# Patient Record
Sex: FEMALE | Race: WHITE | NOT HISPANIC OR LATINO | Employment: UNEMPLOYED
[De-identification: names, ages, dates, MRNs, and addresses within clinical notes are randomized per-mention and may not be internally consistent; named-entity substitution may affect disease eponyms.]

---

## 2017-02-27 ENCOUNTER — RECORDS - HEALTHEAST (OUTPATIENT)
Dept: ADMINISTRATIVE | Facility: OTHER | Age: 52
End: 2017-02-27

## 2017-05-15 ENCOUNTER — AMBULATORY - HEALTHEAST (OUTPATIENT)
Dept: SURGERY | Facility: CLINIC | Age: 52
End: 2017-05-15

## 2017-05-15 ENCOUNTER — COMMUNICATION - HEALTHEAST (OUTPATIENT)
Dept: SURGERY | Facility: CLINIC | Age: 52
End: 2017-05-15

## 2017-05-15 DIAGNOSIS — Z98.84 HISTORY OF ROUX-EN-Y GASTRIC BYPASS: ICD-10-CM

## 2017-05-15 DIAGNOSIS — D50.9 IRON DEFICIENCY ANEMIA: ICD-10-CM

## 2017-05-19 ENCOUNTER — RECORDS - HEALTHEAST (OUTPATIENT)
Dept: ADMINISTRATIVE | Facility: OTHER | Age: 52
End: 2017-05-19

## 2017-06-05 ENCOUNTER — AMBULATORY - HEALTHEAST (OUTPATIENT)
Dept: SURGERY | Facility: CLINIC | Age: 52
End: 2017-06-05

## 2017-06-05 DIAGNOSIS — Z98.84 S/P BARIATRIC SURGERY: ICD-10-CM

## 2017-06-05 DIAGNOSIS — K91.2 OTHER AND UNSPECIFIED POSTSURGICAL NONABSORPTION: ICD-10-CM

## 2017-06-05 DIAGNOSIS — K90.9 UNSPECIFIED INTESTINAL MALABSORPTION: ICD-10-CM

## 2017-06-26 ENCOUNTER — OFFICE VISIT - HEALTHEAST (OUTPATIENT)
Dept: SURGERY | Facility: CLINIC | Age: 52
End: 2017-06-26

## 2017-06-26 ENCOUNTER — AMBULATORY - HEALTHEAST (OUTPATIENT)
Dept: LAB | Facility: CLINIC | Age: 52
End: 2017-06-26

## 2017-06-26 DIAGNOSIS — K91.2 POSTOPERATIVE MALABSORPTION: ICD-10-CM

## 2017-06-26 DIAGNOSIS — R79.89 LOW VITAMIN D LEVEL: ICD-10-CM

## 2017-06-26 DIAGNOSIS — Z71.6 ENCOUNTER FOR SMOKING CESSATION COUNSELING: ICD-10-CM

## 2017-06-26 DIAGNOSIS — F17.200 TOBACCO DEPENDENCE: ICD-10-CM

## 2017-06-26 DIAGNOSIS — D50.9 IRON DEFICIENCY ANEMIA: ICD-10-CM

## 2017-06-26 DIAGNOSIS — K91.2 OTHER AND UNSPECIFIED POSTSURGICAL NONABSORPTION: ICD-10-CM

## 2017-06-26 DIAGNOSIS — Z98.84 S/P BARIATRIC SURGERY: ICD-10-CM

## 2017-06-26 DIAGNOSIS — Z98.84 HISTORY OF ROUX-EN-Y GASTRIC BYPASS: ICD-10-CM

## 2017-06-26 DIAGNOSIS — K90.9 UNSPECIFIED INTESTINAL MALABSORPTION: ICD-10-CM

## 2017-06-26 LAB
CHOLEST SERPL-MCNC: 214 MG/DL
FASTING STATUS PATIENT QL REPORTED: YES
HDLC SERPL-MCNC: 45 MG/DL
LDLC SERPL CALC-MCNC: 137 MG/DL
TRIGL SERPL-MCNC: 159 MG/DL

## 2017-06-26 ASSESSMENT — MIFFLIN-ST. JEOR: SCORE: 1154.73

## 2017-06-28 ENCOUNTER — HOSPITAL ENCOUNTER (OUTPATIENT)
Dept: ADMINISTRATIVE | Facility: OTHER | Age: 52
Discharge: HOME | End: 2017-06-28

## 2017-06-30 ENCOUNTER — COMMUNICATION - HEALTHEAST (OUTPATIENT)
Dept: SURGERY | Facility: CLINIC | Age: 52
End: 2017-06-30

## 2017-09-22 ENCOUNTER — OFFICE VISIT - HEALTHEAST (OUTPATIENT)
Dept: SURGERY | Facility: CLINIC | Age: 52
End: 2017-09-22

## 2017-09-22 DIAGNOSIS — Z98.84 HISTORY OF ROUX-EN-Y GASTRIC BYPASS: ICD-10-CM

## 2017-09-22 DIAGNOSIS — F17.200 TOBACCO DEPENDENCE: ICD-10-CM

## 2017-09-22 DIAGNOSIS — K28.9 ANASTOMOTIC ULCER: ICD-10-CM

## 2017-09-22 ASSESSMENT — MIFFLIN-ST. JEOR: SCORE: 1159.27

## 2018-03-23 ENCOUNTER — RECORDS - HEALTHEAST (OUTPATIENT)
Dept: ADMINISTRATIVE | Facility: OTHER | Age: 53
End: 2018-03-23

## 2018-05-18 ENCOUNTER — RECORDS - HEALTHEAST (OUTPATIENT)
Dept: ADMINISTRATIVE | Facility: OTHER | Age: 53
End: 2018-05-18

## 2018-06-18 ENCOUNTER — AMBULATORY - HEALTHEAST (OUTPATIENT)
Dept: SURGERY | Facility: CLINIC | Age: 53
End: 2018-06-18

## 2018-06-18 DIAGNOSIS — Z98.84 S/P BARIATRIC SURGERY: ICD-10-CM

## 2018-06-18 DIAGNOSIS — K91.2 POSTSURGICAL MALABSORPTION: ICD-10-CM

## 2018-06-18 DIAGNOSIS — K90.9 INTESTINAL MALABSORPTION, UNSPECIFIED TYPE: ICD-10-CM

## 2018-06-26 ENCOUNTER — OFFICE VISIT - HEALTHEAST (OUTPATIENT)
Dept: SURGERY | Facility: CLINIC | Age: 53
End: 2018-06-26

## 2018-06-26 ENCOUNTER — AMBULATORY - HEALTHEAST (OUTPATIENT)
Dept: LAB | Facility: CLINIC | Age: 53
End: 2018-06-26

## 2018-06-26 DIAGNOSIS — K91.2 POSTSURGICAL MALABSORPTION: ICD-10-CM

## 2018-06-26 DIAGNOSIS — Z98.84 HISTORY OF ROUX-EN-Y GASTRIC BYPASS: ICD-10-CM

## 2018-06-26 DIAGNOSIS — K90.9 INTESTINAL MALABSORPTION, UNSPECIFIED TYPE: ICD-10-CM

## 2018-06-26 DIAGNOSIS — F17.200 TOBACCO DEPENDENCE: ICD-10-CM

## 2018-06-26 DIAGNOSIS — Z98.84 S/P BARIATRIC SURGERY: ICD-10-CM

## 2018-06-26 LAB
25(OH)D3 SERPL-MCNC: 40.5 NG/ML (ref 30–80)
ALBUMIN SERPL-MCNC: 3.5 G/DL (ref 3.5–5)
ALP SERPL-CCNC: 98 U/L (ref 45–120)
ALT SERPL W P-5'-P-CCNC: 23 U/L (ref 0–45)
ANION GAP SERPL CALCULATED.3IONS-SCNC: 9 MMOL/L (ref 5–18)
AST SERPL W P-5'-P-CCNC: 29 U/L (ref 0–40)
BILIRUB SERPL-MCNC: 0.5 MG/DL (ref 0–1)
BUN SERPL-MCNC: 11 MG/DL (ref 8–22)
CALCIUM SERPL-MCNC: 8.8 MG/DL (ref 8.5–10.5)
CHLORIDE BLD-SCNC: 109 MMOL/L (ref 98–107)
CHOLEST SERPL-MCNC: 172 MG/DL
CO2 SERPL-SCNC: 25 MMOL/L (ref 22–31)
CREAT SERPL-MCNC: 0.69 MG/DL (ref 0.6–1.1)
ERYTHROCYTE [DISTWIDTH] IN BLOOD BY AUTOMATED COUNT: 17.2 % (ref 11–14.5)
FASTING STATUS PATIENT QL REPORTED: YES
FERRITIN SERPL-MCNC: 15 NG/ML (ref 10–130)
FOLATE SERPL-MCNC: 18.7 NG/ML
GFR SERPL CREATININE-BSD FRML MDRD: >60 ML/MIN/1.73M2
GLUCOSE BLD-MCNC: 89 MG/DL (ref 70–125)
HBA1C MFR BLD: 5.7 % (ref 4.2–6.1)
HCT VFR BLD AUTO: 41.8 % (ref 35–47)
HDLC SERPL-MCNC: 42 MG/DL
HGB BLD-MCNC: 14 G/DL (ref 12–16)
LDLC SERPL CALC-MCNC: 102 MG/DL
MCH RBC QN AUTO: 30.4 PG (ref 27–34)
MCHC RBC AUTO-ENTMCNC: 33.5 G/DL (ref 32–36)
MCV RBC AUTO: 91 FL (ref 80–100)
PLATELET # BLD AUTO: 226 THOU/UL (ref 140–440)
PMV BLD AUTO: 9.6 FL (ref 8.5–12.5)
POTASSIUM BLD-SCNC: 4.3 MMOL/L (ref 3.5–5)
PROT SERPL-MCNC: 6.5 G/DL (ref 6–8)
PTH-INTACT SERPL-MCNC: 54 PG/ML (ref 10–86)
RBC # BLD AUTO: 4.61 MILL/UL (ref 3.8–5.4)
SODIUM SERPL-SCNC: 143 MMOL/L (ref 136–145)
TRIGL SERPL-MCNC: 141 MG/DL
VIT B12 SERPL-MCNC: >2000 PG/ML (ref 213–816)
WBC: 11.2 THOU/UL (ref 4–11)

## 2018-06-26 ASSESSMENT — MIFFLIN-ST. JEOR: SCORE: 1127.52

## 2018-06-28 LAB
ANNOTATION COMMENT IMP: NORMAL
VIT A SERPL-MCNC: 0.34 MG/L (ref 0.3–1.2)
VITAMIN A (RETINYL PALMITATE): <0.02 MG/L (ref 0–0.1)
ZINC SERPL-MCNC: 70 UG/DL (ref 60–120)

## 2018-06-29 LAB — VIT B1 PYROPHOSHATE BLD-SCNC: 123 NMOL/L (ref 70–180)

## 2018-07-09 ENCOUNTER — COMMUNICATION - HEALTHEAST (OUTPATIENT)
Dept: SURGERY | Facility: CLINIC | Age: 53
End: 2018-07-09

## 2018-07-09 DIAGNOSIS — Z98.84 HISTORY OF ROUX-EN-Y GASTRIC BYPASS: ICD-10-CM

## 2018-07-09 DIAGNOSIS — K91.2 POSTOPERATIVE MALABSORPTION: ICD-10-CM

## 2018-07-12 ENCOUNTER — RECORDS - HEALTHEAST (OUTPATIENT)
Dept: BONE DENSITY | Facility: CLINIC | Age: 53
End: 2018-07-12

## 2018-07-12 ENCOUNTER — RECORDS - HEALTHEAST (OUTPATIENT)
Dept: ADMINISTRATIVE | Facility: OTHER | Age: 53
End: 2018-07-12

## 2018-07-12 DIAGNOSIS — Z98.84 BARIATRIC SURGERY STATUS: ICD-10-CM

## 2018-07-12 DIAGNOSIS — F17.200 NICOTINE DEPENDENCE, UNSPECIFIED, UNCOMPLICATED: ICD-10-CM

## 2018-07-13 ENCOUNTER — RECORDS - HEALTHEAST (OUTPATIENT)
Dept: ADMINISTRATIVE | Facility: OTHER | Age: 53
End: 2018-07-13

## 2018-09-07 ENCOUNTER — RECORDS - HEALTHEAST (OUTPATIENT)
Dept: ADMINISTRATIVE | Facility: OTHER | Age: 53
End: 2018-09-07

## 2018-09-17 ENCOUNTER — RECORDS - HEALTHEAST (OUTPATIENT)
Dept: ADMINISTRATIVE | Facility: OTHER | Age: 53
End: 2018-09-17

## 2018-09-21 ENCOUNTER — COMMUNICATION - HEALTHEAST (OUTPATIENT)
Dept: SURGERY | Facility: CLINIC | Age: 53
End: 2018-09-21

## 2018-09-21 DIAGNOSIS — D50.9 IRON DEFICIENCY ANEMIA: ICD-10-CM

## 2018-09-21 DIAGNOSIS — K91.2 POSTOPERATIVE MALABSORPTION: ICD-10-CM

## 2018-10-01 ENCOUNTER — COMMUNICATION - HEALTHEAST (OUTPATIENT)
Dept: SURGERY | Facility: CLINIC | Age: 53
End: 2018-10-01

## 2018-10-01 DIAGNOSIS — K28.9 ANASTOMOTIC ULCER: ICD-10-CM

## 2018-10-01 DIAGNOSIS — Z98.84 HISTORY OF ROUX-EN-Y GASTRIC BYPASS: ICD-10-CM

## 2018-11-02 ENCOUNTER — RECORDS - HEALTHEAST (OUTPATIENT)
Dept: ADMINISTRATIVE | Facility: OTHER | Age: 53
End: 2018-11-02

## 2018-11-07 ENCOUNTER — AMBULATORY - HEALTHEAST (OUTPATIENT)
Dept: PHYSICAL MEDICINE AND REHAB | Facility: CLINIC | Age: 53
End: 2018-11-07

## 2018-11-07 DIAGNOSIS — M47.812 CERVICAL SPONDYLOSIS: ICD-10-CM

## 2018-11-14 ENCOUNTER — RECORDS - HEALTHEAST (OUTPATIENT)
Dept: ADMINISTRATIVE | Facility: OTHER | Age: 53
End: 2018-11-14

## 2018-12-07 ENCOUNTER — HOSPITAL ENCOUNTER (OUTPATIENT)
Dept: PHYSICAL MEDICINE AND REHAB | Facility: CLINIC | Age: 53
Discharge: HOME | End: 2018-12-07
Attending: PHYSICAL MEDICINE & REHABILITATION

## 2018-12-07 DIAGNOSIS — M54.12 CERVICAL RADICULITIS: ICD-10-CM

## 2018-12-07 DIAGNOSIS — M54.6 PAIN IN THORACIC SPINE: ICD-10-CM

## 2018-12-07 DIAGNOSIS — M50.20 CERVICAL DISC HERNIATION: ICD-10-CM

## 2018-12-07 DIAGNOSIS — M54.2 NECK PAIN: ICD-10-CM

## 2018-12-07 DIAGNOSIS — M54.50 CHRONIC BILATERAL LOW BACK PAIN WITHOUT SCIATICA: ICD-10-CM

## 2018-12-07 DIAGNOSIS — G89.29 CHRONIC BILATERAL LOW BACK PAIN WITHOUT SCIATICA: ICD-10-CM

## 2018-12-07 DIAGNOSIS — M79.7 FIBROMYALGIA: ICD-10-CM

## 2018-12-07 ASSESSMENT — MIFFLIN-ST. JEOR: SCORE: 1129.79

## 2018-12-28 ENCOUNTER — RECORDS - HEALTHEAST (OUTPATIENT)
Dept: ADMINISTRATIVE | Facility: OTHER | Age: 53
End: 2018-12-28

## 2019-01-18 ENCOUNTER — COMMUNICATION - HEALTHEAST (OUTPATIENT)
Dept: PHYSICAL MEDICINE AND REHAB | Facility: CLINIC | Age: 54
End: 2019-01-18

## 2019-02-22 ENCOUNTER — RECORDS - HEALTHEAST (OUTPATIENT)
Dept: ADMINISTRATIVE | Facility: OTHER | Age: 54
End: 2019-02-22

## 2019-04-19 ENCOUNTER — RECORDS - HEALTHEAST (OUTPATIENT)
Dept: ADMINISTRATIVE | Facility: OTHER | Age: 54
End: 2019-04-19

## 2019-06-14 ENCOUNTER — RECORDS - HEALTHEAST (OUTPATIENT)
Dept: ADMINISTRATIVE | Facility: OTHER | Age: 54
End: 2019-06-14

## 2019-07-12 ENCOUNTER — RECORDS - HEALTHEAST (OUTPATIENT)
Dept: ADMINISTRATIVE | Facility: OTHER | Age: 54
End: 2019-07-12

## 2019-09-12 ENCOUNTER — RECORDS - HEALTHEAST (OUTPATIENT)
Dept: ADMINISTRATIVE | Facility: OTHER | Age: 54
End: 2019-09-12

## 2019-09-18 ENCOUNTER — RECORDS - HEALTHEAST (OUTPATIENT)
Dept: ADMINISTRATIVE | Facility: OTHER | Age: 54
End: 2019-09-18

## 2019-09-19 ENCOUNTER — OFFICE VISIT - HEALTHEAST (OUTPATIENT)
Dept: FAMILY MEDICINE | Facility: CLINIC | Age: 54
End: 2019-09-19

## 2019-09-19 ENCOUNTER — COMMUNICATION - HEALTHEAST (OUTPATIENT)
Dept: TELEHEALTH | Facility: CLINIC | Age: 54
End: 2019-09-19

## 2019-09-19 DIAGNOSIS — Z76.89 ENCOUNTER TO ESTABLISH CARE: ICD-10-CM

## 2019-09-19 DIAGNOSIS — R13.10 DYSPHAGIA, UNSPECIFIED TYPE: ICD-10-CM

## 2019-09-19 DIAGNOSIS — J45.20 MILD INTERMITTENT ASTHMA WITHOUT COMPLICATION: ICD-10-CM

## 2019-09-19 DIAGNOSIS — N95.1 MENOPAUSAL SYNDROME (HOT FLASHES): ICD-10-CM

## 2019-09-19 DIAGNOSIS — F17.200 TOBACCO DEPENDENCE: ICD-10-CM

## 2019-09-19 DIAGNOSIS — M79.7 FIBROMYALGIA: ICD-10-CM

## 2019-09-19 DIAGNOSIS — H53.8 BLURRED VISION: ICD-10-CM

## 2019-09-19 DIAGNOSIS — G89.29 OTHER CHRONIC PAIN: ICD-10-CM

## 2019-09-19 ASSESSMENT — MIFFLIN-ST. JEOR: SCORE: 1169.48

## 2019-09-25 ENCOUNTER — AMBULATORY - HEALTHEAST (OUTPATIENT)
Dept: PALLIATIVE MEDICINE | Facility: OTHER | Age: 54
End: 2019-09-25

## 2019-09-30 ENCOUNTER — HOSPITAL ENCOUNTER (OUTPATIENT)
Dept: RADIOLOGY | Facility: CLINIC | Age: 54
Discharge: HOME | End: 2019-09-30
Attending: PHYSICIAN ASSISTANT

## 2019-09-30 DIAGNOSIS — R13.10 DYSPHAGIA, UNSPECIFIED TYPE: ICD-10-CM

## 2019-10-02 ENCOUNTER — COMMUNICATION - HEALTHEAST (OUTPATIENT)
Dept: FAMILY MEDICINE | Facility: CLINIC | Age: 54
End: 2019-10-02

## 2019-12-16 ENCOUNTER — RECORDS - HEALTHEAST (OUTPATIENT)
Dept: ADMINISTRATIVE | Facility: OTHER | Age: 54
End: 2019-12-16

## 2020-01-20 ENCOUNTER — OFFICE VISIT - HEALTHEAST (OUTPATIENT)
Dept: FAMILY MEDICINE | Facility: CLINIC | Age: 55
End: 2020-01-20

## 2020-01-20 DIAGNOSIS — Z98.84 HISTORY OF ROUX-EN-Y GASTRIC BYPASS: ICD-10-CM

## 2020-01-20 DIAGNOSIS — N95.0 POST-MENOPAUSAL BLEEDING: ICD-10-CM

## 2020-01-20 DIAGNOSIS — J45.20 MILD INTERMITTENT ASTHMA WITHOUT COMPLICATION: ICD-10-CM

## 2020-01-20 DIAGNOSIS — K21.9 GASTROESOPHAGEAL REFLUX DISEASE WITHOUT ESOPHAGITIS: ICD-10-CM

## 2020-01-20 DIAGNOSIS — M85.80 LOW BONE MASS: ICD-10-CM

## 2020-01-20 DIAGNOSIS — D50.9 IRON DEFICIENCY ANEMIA, UNSPECIFIED IRON DEFICIENCY ANEMIA TYPE: ICD-10-CM

## 2020-01-20 DIAGNOSIS — K91.2 POSTSURGICAL MALABSORPTION: ICD-10-CM

## 2020-01-20 DIAGNOSIS — F32.A DEPRESSION, UNSPECIFIED DEPRESSION TYPE: ICD-10-CM

## 2020-01-20 DIAGNOSIS — Z00.00 ANNUAL PHYSICAL EXAM: ICD-10-CM

## 2020-01-20 DIAGNOSIS — G89.29 OTHER CHRONIC PAIN: ICD-10-CM

## 2020-01-20 DIAGNOSIS — F17.200 TOBACCO DEPENDENCE: ICD-10-CM

## 2020-01-20 DIAGNOSIS — K91.2 POSTOPERATIVE MALABSORPTION: ICD-10-CM

## 2020-01-20 DIAGNOSIS — E55.9 VITAMIN D DEFICIENCY: ICD-10-CM

## 2020-01-20 DIAGNOSIS — Z81.8 FAMILY HISTORY OF STRESS: ICD-10-CM

## 2020-01-20 DIAGNOSIS — M79.7 FIBROMYALGIA: ICD-10-CM

## 2020-01-20 DIAGNOSIS — E60 ZINC DEFICIENCY: ICD-10-CM

## 2020-01-20 LAB
BASOPHILS # BLD AUTO: 0.1 THOU/UL (ref 0–0.2)
BASOPHILS NFR BLD AUTO: 1 % (ref 0–2)
CHOLEST SERPL-MCNC: 159 MG/DL
CLUE CELLS: NORMAL
EOSINOPHIL # BLD AUTO: 0.6 THOU/UL (ref 0–0.4)
EOSINOPHIL NFR BLD AUTO: 7 % (ref 0–6)
ERYTHROCYTE [DISTWIDTH] IN BLOOD BY AUTOMATED COUNT: 14.8 % (ref 11–14.5)
FASTING STATUS PATIENT QL REPORTED: YES
FASTING STATUS PATIENT QL REPORTED: YES
GLUCOSE BLD-MCNC: 93 MG/DL (ref 70–99)
HCT VFR BLD AUTO: 37.7 % (ref 35–47)
HDLC SERPL-MCNC: 47 MG/DL
HGB BLD-MCNC: 12.2 G/DL (ref 12–16)
LDLC SERPL CALC-MCNC: 88 MG/DL
LYMPHOCYTES # BLD AUTO: 2.1 THOU/UL (ref 0.8–4.4)
LYMPHOCYTES NFR BLD AUTO: 22 % (ref 20–40)
MCH RBC QN AUTO: 28.2 PG (ref 27–34)
MCHC RBC AUTO-ENTMCNC: 32.2 G/DL (ref 32–36)
MCV RBC AUTO: 87 FL (ref 80–100)
MONOCYTES # BLD AUTO: 0.9 THOU/UL (ref 0–0.9)
MONOCYTES NFR BLD AUTO: 9 % (ref 2–10)
NEUTROPHILS # BLD AUTO: 5.9 THOU/UL (ref 2–7.7)
NEUTROPHILS NFR BLD AUTO: 62 % (ref 50–70)
PLATELET # BLD AUTO: 204 THOU/UL (ref 140–440)
PMV BLD AUTO: 7.9 FL (ref 7–10)
RBC # BLD AUTO: 4.31 MILL/UL (ref 3.8–5.4)
TRICHOMONAS, WET PREP: NORMAL
TRIGL SERPL-MCNC: 121 MG/DL
VIT B12 SERPL-MCNC: >2000 PG/ML (ref 213–816)
WBC: 9.5 THOU/UL (ref 4–11)
YEAST, WET PREP: NORMAL

## 2020-01-20 ASSESSMENT — MIFFLIN-ST. JEOR: SCORE: 1213.7

## 2020-01-21 LAB
25(OH)D3 SERPL-MCNC: 24.9 NG/ML (ref 30–80)
C TRACH DNA SPEC QL PROBE+SIG AMP: NEGATIVE
HPV SOURCE: NORMAL
HUMAN PAPILLOMA VIRUS 16 DNA: NEGATIVE
HUMAN PAPILLOMA VIRUS 18 DNA: NEGATIVE
HUMAN PAPILLOMA VIRUS FINAL DIAGNOSIS: NORMAL
HUMAN PAPILLOMA VIRUS OTHER HR: NEGATIVE
N GONORRHOEA DNA SPEC QL NAA+PROBE: NEGATIVE
SPECIMEN DESCRIPTION: NORMAL

## 2020-01-28 ENCOUNTER — OFFICE VISIT - HEALTHEAST (OUTPATIENT)
Dept: SURGERY | Facility: CLINIC | Age: 55
End: 2020-01-28

## 2020-01-28 ENCOUNTER — COMMUNICATION - HEALTHEAST (OUTPATIENT)
Dept: FAMILY MEDICINE | Facility: CLINIC | Age: 55
End: 2020-01-28

## 2020-01-28 DIAGNOSIS — R10.32 ABDOMINAL PAIN, LEFT LOWER QUADRANT: ICD-10-CM

## 2020-01-28 DIAGNOSIS — Z98.84 HISTORY OF ROUX-EN-Y GASTRIC BYPASS: ICD-10-CM

## 2020-01-28 DIAGNOSIS — M85.80 LOW BONE MASS: ICD-10-CM

## 2020-01-28 DIAGNOSIS — Z78.0 POST-MENOPAUSAL: ICD-10-CM

## 2020-01-28 DIAGNOSIS — K21.9 GASTROESOPHAGEAL REFLUX DISEASE WITHOUT ESOPHAGITIS: ICD-10-CM

## 2020-01-28 DIAGNOSIS — K91.2 POSTSURGICAL MALABSORPTION: ICD-10-CM

## 2020-01-28 DIAGNOSIS — Z72.0 TOBACCO ABUSE: ICD-10-CM

## 2020-01-28 LAB
BKR LAB AP ABNORMAL BLEEDING: NORMAL
BKR LAB AP BIRTH CONTROL/HORMONES: NORMAL
BKR LAB AP CERVICAL APPEARANCE: NORMAL
BKR LAB AP GYN ADEQUACY: NORMAL
BKR LAB AP GYN INTERPRETATION: NORMAL
BKR LAB AP HPV REFLEX: NORMAL
BKR LAB AP LMP: NORMAL
BKR LAB AP PATIENT STATUS: NORMAL
BKR LAB AP PREVIOUS ABNORMAL: NORMAL
BKR LAB AP PREVIOUS NORMAL: NORMAL
HIGH RISK?: NO
PATH REPORT.COMMENTS IMP SPEC: NORMAL
RESULT FLAG (HE HISTORICAL CONVERSION): NORMAL

## 2020-01-28 ASSESSMENT — MIFFLIN-ST. JEOR: SCORE: 1225.04

## 2020-02-04 ENCOUNTER — HOSPITAL ENCOUNTER (OUTPATIENT)
Dept: PALLIATIVE MEDICINE | Facility: OTHER | Age: 55
Discharge: HOME | End: 2020-02-04
Attending: NURSE PRACTITIONER

## 2020-02-04 DIAGNOSIS — G89.29 OTHER CHRONIC PAIN: ICD-10-CM

## 2020-02-04 DIAGNOSIS — G89.4 CHRONIC PAIN SYNDROME: ICD-10-CM

## 2020-02-04 ASSESSMENT — MIFFLIN-ST. JEOR: SCORE: 1196.92

## 2020-02-06 LAB
6MAM UR QL: NOT DETECTED
7AMINOCLONAZEPAM UR QL: NOT DETECTED
A-OH ALPRAZ UR QL: NOT DETECTED
ALPRAZ UR QL: NOT DETECTED
AMPHET UR QL SCN: NOT DETECTED
BARBITURATES UR QL: NOT DETECTED
BUPRENORPHINE UR QL: NOT DETECTED
BZE UR QL: NOT DETECTED
CARBOXYTHC UR QL: PRESENT
CARISOPRODOL UR QL: NOT DETECTED
CLONAZEPAM UR QL: NOT DETECTED
CODEINE UR QL: NOT DETECTED
CREAT UR-MCNC: 93.2 MG/DL (ref 20–400)
DIAZEPAM UR QL: NOT DETECTED
ETHYL GLUCURONIDE UR QL: NOT DETECTED
FENTANYL UR QL: NOT DETECTED
HYDROCODONE UR QL: NOT DETECTED
HYDROMORPHONE UR QL: NOT DETECTED
LORAZEPAM UR QL: NOT DETECTED
MDA UR QL: NOT DETECTED
MDEA UR QL: NOT DETECTED
MDMA UR QL: NOT DETECTED
ME-PHENIDATE UR QL: NOT DETECTED
MEPERIDINE UR QL: NOT DETECTED
METHADONE UR QL: NOT DETECTED
METHAMPHET UR QL: NOT DETECTED
MIDAZOLAM UR QL SCN: NOT DETECTED
MORPHINE UR QL: NOT DETECTED
NORBUPRENORPHINE UR QL CFM: NOT DETECTED
NORDIAZEPAM UR QL: NOT DETECTED
NORFENTANYL UR QL: NOT DETECTED
NORHYDROCODONE UR QL CFM: NOT DETECTED
NOROXYCODONE UR QL CFM: PRESENT
NOROXYMORPHONE UR QL SCN: PRESENT
OXAZEPAM UR QL: NOT DETECTED
OXYCODONE UR QL: PRESENT
OXYMORPHONE UR QL: NOT DETECTED
PATHOLOGY STUDY: NORMAL
PCP UR QL: NOT DETECTED
PHENTERMINE UR QL: NOT DETECTED
PROPOXYPH UR QL: NOT DETECTED
SERVICE CMNT-IMP: NORMAL
TAPENTADOL UR QL SCN: NOT DETECTED
TAPENTADOL UR QL SCN: NOT DETECTED
TEMAZEPAM UR QL: NOT DETECTED
TRAMADOL UR QL: NOT DETECTED
ZOLPIDEM UR QL: NOT DETECTED

## 2020-02-13 LAB
6MAM UR CFM-MCNC: <10 NG/ML
CARBOXYTHC UR-MCNC: >500 NG/ML
CODEINE UR CFM-MCNC: <20 NG/ML
HYDROCODONE UR CFM-MCNC: <20 NG/ML
HYDROMORPHONE UR CFM-MCNC: <20 NG/ML
MORPHINE UR CFM-MCNC: <20 NG/ML
NORHYDROCODONE UR CFM-MCNC: <20 NG/ML
NOROXYCODONE UR CFM-MCNC: 2245 NG/ML
NOROXYMORPHONE UR QL SCN: 512 NG/ML
OXYCODONE UR CFM-MCNC: 931 NG/ML
OXYMORPHONE UR CFM-MCNC: 25 NG/ML

## 2020-02-21 ENCOUNTER — OFFICE VISIT - HEALTHEAST (OUTPATIENT)
Dept: FAMILY MEDICINE | Facility: CLINIC | Age: 55
End: 2020-02-21

## 2020-02-21 DIAGNOSIS — N30.00 ACUTE CYSTITIS WITHOUT HEMATURIA: ICD-10-CM

## 2020-02-21 DIAGNOSIS — R35.0 URINARY FREQUENCY: ICD-10-CM

## 2020-02-21 LAB
BACTERIA #/AREA URNS HPF: ABNORMAL HPF
RBC #/AREA URNS AUTO: ABNORMAL HPF
SQUAMOUS #/AREA URNS AUTO: ABNORMAL LPF
WBC #/AREA URNS AUTO: ABNORMAL HPF
WBC CLUMPS #/AREA URNS HPF: PRESENT /[HPF]

## 2020-02-21 ASSESSMENT — MIFFLIN-ST. JEOR: SCORE: 1191.02

## 2020-02-24 LAB
BACTERIA SPEC CULT: ABNORMAL
BACTERIA SPEC CULT: ABNORMAL

## 2020-03-04 ENCOUNTER — OFFICE VISIT - HEALTHEAST (OUTPATIENT)
Dept: BEHAVIORAL HEALTH | Facility: CLINIC | Age: 55
End: 2020-03-04

## 2020-03-04 DIAGNOSIS — F43.23 ADJUSTMENT DISORDER WITH MIXED ANXIETY AND DEPRESSED MOOD: ICD-10-CM

## 2020-03-04 DIAGNOSIS — Z63.79 STRESSFUL LIFE EVENTS AFFECTING FAMILY AND HOUSEHOLD: ICD-10-CM

## 2020-03-04 DIAGNOSIS — F17.200 TOBACCO USE DISORDER: ICD-10-CM

## 2020-03-04 ASSESSMENT — ANXIETY QUESTIONNAIRES
GAD7 TOTAL SCORE: 7
4. TROUBLE RELAXING: MORE THAN HALF THE DAYS
1. FEELING NERVOUS, ANXIOUS, OR ON EDGE: SEVERAL DAYS
5. BEING SO RESTLESS THAT IT IS HARD TO SIT STILL: NOT AT ALL
3. WORRYING TOO MUCH ABOUT DIFFERENT THINGS: MORE THAN HALF THE DAYS
6. BECOMING EASILY ANNOYED OR IRRITABLE: SEVERAL DAYS
7. FEELING AFRAID AS IF SOMETHING AWFUL MIGHT HAPPEN: NOT AT ALL
2. NOT BEING ABLE TO STOP OR CONTROL WORRYING: SEVERAL DAYS
IF YOU CHECKED OFF ANY PROBLEMS ON THIS QUESTIONNAIRE, HOW DIFFICULT HAVE THESE PROBLEMS MADE IT FOR YOU TO DO YOUR WORK, TAKE CARE OF THINGS AT HOME, OR GET ALONG WITH OTHER PEOPLE: SOMEWHAT DIFFICULT

## 2020-03-04 ASSESSMENT — PATIENT HEALTH QUESTIONNAIRE - PHQ9: SUM OF ALL RESPONSES TO PHQ QUESTIONS 1-9: 5

## 2020-04-03 ENCOUNTER — OFFICE VISIT - HEALTHEAST (OUTPATIENT)
Dept: BEHAVIORAL HEALTH | Facility: CLINIC | Age: 55
End: 2020-04-03

## 2020-04-03 DIAGNOSIS — F17.200 TOBACCO USE DISORDER: ICD-10-CM

## 2020-04-03 DIAGNOSIS — Z63.79 STRESSFUL LIFE EVENTS AFFECTING FAMILY AND HOUSEHOLD: ICD-10-CM

## 2020-04-03 DIAGNOSIS — F43.23 ADJUSTMENT DISORDER WITH MIXED ANXIETY AND DEPRESSED MOOD: ICD-10-CM

## 2020-04-15 ENCOUNTER — AMBULATORY - HEALTHEAST (OUTPATIENT)
Dept: BEHAVIORAL HEALTH | Facility: CLINIC | Age: 55
End: 2020-04-15

## 2020-04-29 ENCOUNTER — AMBULATORY - HEALTHEAST (OUTPATIENT)
Dept: BEHAVIORAL HEALTH | Facility: CLINIC | Age: 55
End: 2020-04-29

## 2020-05-05 ENCOUNTER — COMMUNICATION - HEALTHEAST (OUTPATIENT)
Dept: SCHEDULING | Facility: CLINIC | Age: 55
End: 2020-05-05

## 2020-05-06 ENCOUNTER — OFFICE VISIT - HEALTHEAST (OUTPATIENT)
Dept: FAMILY MEDICINE | Facility: CLINIC | Age: 55
End: 2020-05-06

## 2020-05-06 DIAGNOSIS — J45.20 MILD INTERMITTENT ASTHMA WITHOUT COMPLICATION: ICD-10-CM

## 2020-05-06 DIAGNOSIS — Z98.84 HISTORY OF ROUX-EN-Y GASTRIC BYPASS: ICD-10-CM

## 2020-05-06 DIAGNOSIS — K91.2 POSTSURGICAL MALABSORPTION: ICD-10-CM

## 2020-05-06 DIAGNOSIS — M79.7 FIBROMYALGIA: ICD-10-CM

## 2020-05-06 DIAGNOSIS — K91.2 POSTOPERATIVE MALABSORPTION: ICD-10-CM

## 2020-05-06 DIAGNOSIS — S30.0XXD TRAUMATIC HEMATOMA OF BUTTOCK, SUBSEQUENT ENCOUNTER: ICD-10-CM

## 2020-05-06 ASSESSMENT — MIFFLIN-ST. JEOR: SCORE: 1159.27

## 2020-05-26 ENCOUNTER — TRANSCRIBE ORDERS (OUTPATIENT)
Dept: OTHER | Age: 55
End: 2020-05-26

## 2020-05-26 DIAGNOSIS — M79.7 FIBROMYALGIA: Primary | ICD-10-CM

## 2020-08-07 ENCOUNTER — OFFICE VISIT - HEALTHEAST (OUTPATIENT)
Dept: BEHAVIORAL HEALTH | Facility: CLINIC | Age: 55
End: 2020-08-07

## 2020-08-07 DIAGNOSIS — F43.23 ADJUSTMENT DISORDER WITH MIXED ANXIETY AND DEPRESSED MOOD: ICD-10-CM

## 2020-08-28 ENCOUNTER — OFFICE VISIT - HEALTHEAST (OUTPATIENT)
Dept: BEHAVIORAL HEALTH | Facility: CLINIC | Age: 55
End: 2020-08-28

## 2020-08-28 DIAGNOSIS — F33.0 MILD EPISODE OF RECURRENT MAJOR DEPRESSIVE DISORDER (H): ICD-10-CM

## 2020-08-28 DIAGNOSIS — F17.200 TOBACCO USE DISORDER: ICD-10-CM

## 2020-08-28 ASSESSMENT — ANXIETY QUESTIONNAIRES
3. WORRYING TOO MUCH ABOUT DIFFERENT THINGS: MORE THAN HALF THE DAYS
6. BECOMING EASILY ANNOYED OR IRRITABLE: SEVERAL DAYS
GAD7 TOTAL SCORE: 10
1. FEELING NERVOUS, ANXIOUS, OR ON EDGE: MORE THAN HALF THE DAYS
4. TROUBLE RELAXING: MORE THAN HALF THE DAYS
2. NOT BEING ABLE TO STOP OR CONTROL WORRYING: MORE THAN HALF THE DAYS
5. BEING SO RESTLESS THAT IT IS HARD TO SIT STILL: SEVERAL DAYS
7. FEELING AFRAID AS IF SOMETHING AWFUL MIGHT HAPPEN: NOT AT ALL
IF YOU CHECKED OFF ANY PROBLEMS ON THIS QUESTIONNAIRE, HOW DIFFICULT HAVE THESE PROBLEMS MADE IT FOR YOU TO DO YOUR WORK, TAKE CARE OF THINGS AT HOME, OR GET ALONG WITH OTHER PEOPLE: SOMEWHAT DIFFICULT

## 2020-08-28 ASSESSMENT — PATIENT HEALTH QUESTIONNAIRE - PHQ9: SUM OF ALL RESPONSES TO PHQ QUESTIONS 1-9: 7

## 2020-09-01 ENCOUNTER — OFFICE VISIT - HEALTHEAST (OUTPATIENT)
Dept: RHEUMATOLOGY | Facility: CLINIC | Age: 55
End: 2020-09-01

## 2020-09-01 DIAGNOSIS — M79.641 PAIN IN BOTH HANDS: ICD-10-CM

## 2020-09-01 DIAGNOSIS — M25.522 PAIN OF BOTH ELBOWS: ICD-10-CM

## 2020-09-01 DIAGNOSIS — G89.29 CHRONIC NECK PAIN: ICD-10-CM

## 2020-09-01 DIAGNOSIS — M54.2 CHRONIC NECK PAIN: ICD-10-CM

## 2020-09-01 DIAGNOSIS — J32.9 RECURRENT SINUS INFECTIONS: ICD-10-CM

## 2020-09-01 DIAGNOSIS — M25.521 PAIN OF BOTH ELBOWS: ICD-10-CM

## 2020-09-01 DIAGNOSIS — M79.7 FIBROMYALGIA: ICD-10-CM

## 2020-09-01 DIAGNOSIS — L93.2 CUTANEOUS LUPUS ERYTHEMATOSUS: ICD-10-CM

## 2020-09-01 DIAGNOSIS — R20.2 RIGHT LEG PARESTHESIAS: ICD-10-CM

## 2020-09-01 DIAGNOSIS — M79.642 PAIN IN BOTH HANDS: ICD-10-CM

## 2020-09-02 ENCOUNTER — COMMUNICATION - HEALTHEAST (OUTPATIENT)
Dept: RHEUMATOLOGY | Facility: CLINIC | Age: 55
End: 2020-09-02

## 2020-09-04 ENCOUNTER — OFFICE VISIT - HEALTHEAST (OUTPATIENT)
Dept: FAMILY MEDICINE | Facility: CLINIC | Age: 55
End: 2020-09-04

## 2020-09-04 DIAGNOSIS — R03.0 BLOOD PRESSURE ELEVATED WITHOUT HISTORY OF HTN: ICD-10-CM

## 2020-09-04 DIAGNOSIS — Z72.0 TOBACCO ABUSE: ICD-10-CM

## 2020-09-04 DIAGNOSIS — K21.9 GASTROESOPHAGEAL REFLUX DISEASE WITHOUT ESOPHAGITIS: ICD-10-CM

## 2020-09-04 DIAGNOSIS — R20.0 RIGHT LEG NUMBNESS: ICD-10-CM

## 2020-09-04 DIAGNOSIS — J45.20 MILD INTERMITTENT ASTHMA WITHOUT COMPLICATION: ICD-10-CM

## 2020-09-04 DIAGNOSIS — M21.371 RIGHT FOOT DROP: ICD-10-CM

## 2020-09-04 DIAGNOSIS — Z98.84 HISTORY OF ROUX-EN-Y GASTRIC BYPASS: ICD-10-CM

## 2020-09-04 DIAGNOSIS — K91.2 POSTSURGICAL MALABSORPTION: ICD-10-CM

## 2020-09-04 DIAGNOSIS — Z12.31 ENCOUNTER FOR SCREENING MAMMOGRAM FOR BREAST CANCER: ICD-10-CM

## 2020-09-04 DIAGNOSIS — R05.9 COUGH: ICD-10-CM

## 2020-09-04 ASSESSMENT — MIFFLIN-ST. JEOR: SCORE: 1136.59

## 2020-09-06 ENCOUNTER — COMMUNICATION - HEALTHEAST (OUTPATIENT)
Dept: SCHEDULING | Facility: CLINIC | Age: 55
End: 2020-09-06

## 2020-09-11 ENCOUNTER — RECORDS - HEALTHEAST (OUTPATIENT)
Dept: GENERAL RADIOLOGY | Facility: CLINIC | Age: 55
End: 2020-09-11

## 2020-09-11 ENCOUNTER — AMBULATORY - HEALTHEAST (OUTPATIENT)
Dept: LAB | Facility: CLINIC | Age: 55
End: 2020-09-11

## 2020-09-11 ENCOUNTER — RECORDS - HEALTHEAST (OUTPATIENT)
Dept: MAMMOGRAPHY | Facility: CLINIC | Age: 55
End: 2020-09-11

## 2020-09-11 DIAGNOSIS — M79.641 PAIN IN BOTH HANDS: ICD-10-CM

## 2020-09-11 DIAGNOSIS — M79.642 PAIN IN LEFT HAND: ICD-10-CM

## 2020-09-11 DIAGNOSIS — L93.2 OTHER LOCAL LUPUS ERYTHEMATOSUS: ICD-10-CM

## 2020-09-11 DIAGNOSIS — G89.29 CHRONIC NECK PAIN: ICD-10-CM

## 2020-09-11 DIAGNOSIS — M79.641 PAIN IN RIGHT HAND: ICD-10-CM

## 2020-09-11 DIAGNOSIS — J32.9 RECURRENT SINUS INFECTIONS: ICD-10-CM

## 2020-09-11 DIAGNOSIS — M25.521 PAIN OF BOTH ELBOWS: ICD-10-CM

## 2020-09-11 DIAGNOSIS — Z12.31 ENCOUNTER FOR SCREENING MAMMOGRAM FOR MALIGNANT NEOPLASM OF BREAST: ICD-10-CM

## 2020-09-11 DIAGNOSIS — G89.29 OTHER CHRONIC PAIN: ICD-10-CM

## 2020-09-11 DIAGNOSIS — M79.642 PAIN IN BOTH HANDS: ICD-10-CM

## 2020-09-11 DIAGNOSIS — M54.2 CHRONIC NECK PAIN: ICD-10-CM

## 2020-09-11 DIAGNOSIS — M25.522 PAIN OF BOTH ELBOWS: ICD-10-CM

## 2020-09-11 DIAGNOSIS — M54.2 CERVICALGIA: ICD-10-CM

## 2020-09-11 DIAGNOSIS — M79.7 FIBROMYALGIA: ICD-10-CM

## 2020-09-11 DIAGNOSIS — L93.2 CUTANEOUS LUPUS ERYTHEMATOSUS: ICD-10-CM

## 2020-09-11 DIAGNOSIS — R20.2 RIGHT LEG PARESTHESIAS: ICD-10-CM

## 2020-09-11 LAB
ALBUMIN SERPL-MCNC: 4.3 G/DL (ref 3.5–5)
ALT SERPL W P-5'-P-CCNC: 27 U/L (ref 0–45)
AST SERPL W P-5'-P-CCNC: 28 U/L (ref 0–40)
BASOPHILS # BLD AUTO: 0.1 THOU/UL (ref 0–0.2)
BASOPHILS NFR BLD AUTO: 1 % (ref 0–2)
C REACTIVE PROTEIN LHE: 0.1 MG/DL (ref 0–0.8)
C3 SERPL-MCNC: 119 MG/DL (ref 83–177)
C4 SERPL-MCNC: 24 MG/DL (ref 19–59)
CK SERPL-CCNC: 215 U/L (ref 30–190)
CREAT SERPL-MCNC: 0.78 MG/DL (ref 0.6–1.1)
EOSINOPHIL # BLD AUTO: 0.7 THOU/UL (ref 0–0.4)
EOSINOPHIL NFR BLD AUTO: 6 % (ref 0–6)
ERYTHROCYTE [DISTWIDTH] IN BLOOD BY AUTOMATED COUNT: 15.7 % (ref 11–14.5)
ERYTHROCYTE [SEDIMENTATION RATE] IN BLOOD BY WESTERGREN METHOD: 8 MM/HR (ref 0–20)
FASTING STATUS PATIENT QL REPORTED: YES
GFR SERPL CREATININE-BSD FRML MDRD: >60 ML/MIN/1.73M2
GLUCOSE BLD-MCNC: 93 MG/DL (ref 70–99)
HBV SURFACE AG SERPL QL IA: NEGATIVE
HCT VFR BLD AUTO: 43.7 % (ref 35–47)
HGB BLD-MCNC: 13.8 G/DL (ref 12–16)
LYMPHOCYTES # BLD AUTO: 2.8 THOU/UL (ref 0.8–4.4)
LYMPHOCYTES NFR BLD AUTO: 28 % (ref 20–40)
MCH RBC QN AUTO: 27.8 PG (ref 27–34)
MCHC RBC AUTO-ENTMCNC: 31.7 G/DL (ref 32–36)
MCV RBC AUTO: 88 FL (ref 80–100)
MONOCYTES # BLD AUTO: 1.1 THOU/UL (ref 0–0.9)
MONOCYTES NFR BLD AUTO: 11 % (ref 2–10)
NEUTROPHILS # BLD AUTO: 5.6 THOU/UL (ref 2–7.7)
NEUTROPHILS NFR BLD AUTO: 54 % (ref 50–70)
PLATELET # BLD AUTO: 266 THOU/UL (ref 140–440)
PMV BLD AUTO: 8.1 FL (ref 7–10)
RBC # BLD AUTO: 4.98 MILL/UL (ref 3.8–5.4)
RHEUMATOID FACT SERPL-ACNC: <15 IU/ML (ref 0–30)
TSH SERPL DL<=0.005 MIU/L-ACNC: 6.53 UIU/ML (ref 0.3–5)
URATE SERPL-MCNC: 3 MG/DL (ref 2–7.5)
VIT B12 SERPL-MCNC: >2000 PG/ML (ref 213–816)
WBC: 10.2 THOU/UL (ref 4–11)

## 2020-09-12 LAB
ACE SERPL-CCNC: 40 U/L (ref 9–67)
ANCA IGG TITR SER IF: NORMAL {TITER}
CARDIOLIPIN IGA SER IA-ACNC: 4.5 APL U/ML (ref 0–19.9)
CARDIOLIPIN IGG SER IA-ACNC: <1.6 GPL-U/ML (ref 0–19.9)
CARDIOLIPIN IGM SER IA-ACNC: 3.5 MPL-U/ML (ref 0–19.9)

## 2020-09-14 LAB
B BURGDOR IGG+IGM SER QL: 0.14 INDEX VALUE
HCV AB SERPL QL IA: NEGATIVE

## 2020-09-15 ENCOUNTER — COMMUNICATION - HEALTHEAST (OUTPATIENT)
Dept: FAMILY MEDICINE | Facility: CLINIC | Age: 55
End: 2020-09-15

## 2020-09-15 DIAGNOSIS — M79.7 FIBROMYALGIA: ICD-10-CM

## 2020-09-15 LAB
B LOCUS: NORMAL
B27TEST METHOD: NORMAL
CCP AB SER IA-ACNC: <0.5 U/ML
GAMMA INTERFERON BACKGROUND BLD IA-ACNC: 0.17 IU/ML
M TB IFN-G BLD-IMP: NEGATIVE
MITOGEN IGNF BCKGRD COR BLD-ACNC: -0.01 IU/ML
MITOGEN IGNF BCKGRD COR BLD-ACNC: 0.02 IU/ML
QTF INTERPRETATION: NORMAL
QTF MITOGEN - NIL: 4.76 IU/ML

## 2020-09-16 LAB
ANA SER QL: 11.1 U
LA PPP-IMP: NEGATIVE

## 2020-09-18 ENCOUNTER — OFFICE VISIT - HEALTHEAST (OUTPATIENT)
Dept: BEHAVIORAL HEALTH | Facility: CLINIC | Age: 55
End: 2020-09-18

## 2020-09-18 ENCOUNTER — AMBULATORY - HEALTHEAST (OUTPATIENT)
Dept: BEHAVIORAL HEALTH | Facility: CLINIC | Age: 55
End: 2020-09-18

## 2020-09-18 DIAGNOSIS — F33.0 MILD EPISODE OF RECURRENT MAJOR DEPRESSIVE DISORDER (H): ICD-10-CM

## 2020-09-22 LAB — DNA (DS) ANTIBODY - HISTORICAL: 10 IU

## 2020-09-28 ENCOUNTER — RECORDS - HEALTHEAST (OUTPATIENT)
Dept: ADMINISTRATIVE | Facility: OTHER | Age: 55
End: 2020-09-28

## 2020-09-28 ENCOUNTER — RECORDS - HEALTHEAST (OUTPATIENT)
Dept: RADIOLOGY | Facility: CLINIC | Age: 55
End: 2020-09-28

## 2020-09-30 ENCOUNTER — COMMUNICATION - HEALTHEAST (OUTPATIENT)
Dept: RHEUMATOLOGY | Facility: CLINIC | Age: 55
End: 2020-09-30

## 2020-09-30 DIAGNOSIS — R76.8 POSITIVE ANA (ANTINUCLEAR ANTIBODY): ICD-10-CM

## 2020-10-05 LAB
JO-1 AUTOANTIBODIES - HISTORICAL: 0 EU
SCL-70 AUTOANTIBODIES - HISTORICAL: 0 EU
SM (SMITH AUTOANTIBODIES - HISTORICAL: 0 EU
SM/RNP AUTOANTIBODIES - HISTORICAL: 0 EU
SS-A/RO AUTOANTIBODIES - HISTORICAL: 1 EU
SS-B/LA AUTOANTIBODIES - HISTORICAL: 0 EU

## 2020-10-09 ENCOUNTER — AMBULATORY - HEALTHEAST (OUTPATIENT)
Dept: BEHAVIORAL HEALTH | Facility: CLINIC | Age: 55
End: 2020-10-09

## 2020-10-14 ENCOUNTER — COMMUNICATION - HEALTHEAST (OUTPATIENT)
Dept: RHEUMATOLOGY | Facility: CLINIC | Age: 55
End: 2020-10-14

## 2020-10-19 ENCOUNTER — MEDICAL CORRESPONDENCE (OUTPATIENT)
Dept: HEALTH INFORMATION MANAGEMENT | Facility: CLINIC | Age: 55
End: 2020-10-19

## 2020-11-02 ENCOUNTER — OFFICE VISIT - HEALTHEAST (OUTPATIENT)
Dept: BEHAVIORAL HEALTH | Facility: CLINIC | Age: 55
End: 2020-11-02

## 2020-11-02 DIAGNOSIS — F33.0 MILD EPISODE OF RECURRENT MAJOR DEPRESSIVE DISORDER (H): ICD-10-CM

## 2020-11-02 DIAGNOSIS — F17.200 TOBACCO USE DISORDER: ICD-10-CM

## 2020-11-03 ENCOUNTER — OFFICE VISIT - HEALTHEAST (OUTPATIENT)
Dept: RHEUMATOLOGY | Facility: CLINIC | Age: 55
End: 2020-11-03

## 2020-11-03 DIAGNOSIS — R79.89 ELEVATED TSH: ICD-10-CM

## 2020-11-03 DIAGNOSIS — R76.8 POSITIVE ANA (ANTINUCLEAR ANTIBODY): ICD-10-CM

## 2020-11-03 DIAGNOSIS — L93.2 CUTANEOUS LUPUS ERYTHEMATOSUS: ICD-10-CM

## 2020-11-03 DIAGNOSIS — M79.7 FIBROMYALGIA: ICD-10-CM

## 2020-11-16 ENCOUNTER — OFFICE VISIT - HEALTHEAST (OUTPATIENT)
Dept: FAMILY MEDICINE | Facility: CLINIC | Age: 55
End: 2020-11-16

## 2020-11-16 ENCOUNTER — OFFICE VISIT - HEALTHEAST (OUTPATIENT)
Dept: BEHAVIORAL HEALTH | Facility: CLINIC | Age: 55
End: 2020-11-16

## 2020-11-16 DIAGNOSIS — D72.19 EOSINOPHILIC LEUKOCYTOSIS, UNSPECIFIED TYPE: ICD-10-CM

## 2020-11-16 DIAGNOSIS — J01.90 ACUTE NON-RECURRENT SINUSITIS, UNSPECIFIED LOCATION: ICD-10-CM

## 2020-11-16 DIAGNOSIS — R74.8 ELEVATED CK: ICD-10-CM

## 2020-11-16 DIAGNOSIS — R79.89 ELEVATED TSH: ICD-10-CM

## 2020-11-16 DIAGNOSIS — F33.0 MILD EPISODE OF RECURRENT MAJOR DEPRESSIVE DISORDER (H): ICD-10-CM

## 2020-11-16 DIAGNOSIS — R76.8 POSITIVE ANA (ANTINUCLEAR ANTIBODY): ICD-10-CM

## 2020-11-16 DIAGNOSIS — M79.7 FIBROMYALGIA: ICD-10-CM

## 2020-11-16 DIAGNOSIS — R79.89 ELEVATED VITAMIN B12 LEVEL: ICD-10-CM

## 2020-11-16 DIAGNOSIS — D50.9 IRON DEFICIENCY ANEMIA, UNSPECIFIED IRON DEFICIENCY ANEMIA TYPE: ICD-10-CM

## 2020-11-16 LAB
ALBUMIN UR-MCNC: NEGATIVE MG/DL
APPEARANCE UR: CLEAR
BASOPHILS # BLD AUTO: 0.1 THOU/UL (ref 0–0.2)
BASOPHILS NFR BLD AUTO: 1 % (ref 0–2)
BILIRUB UR QL STRIP: NEGATIVE
CK SERPL-CCNC: 93 U/L (ref 30–190)
COLOR UR AUTO: YELLOW
CREAT UR-MCNC: 45.3 MG/DL
EOSINOPHIL # BLD AUTO: 0.8 THOU/UL (ref 0–0.4)
EOSINOPHIL NFR BLD AUTO: 9 % (ref 0–6)
ERYTHROCYTE [DISTWIDTH] IN BLOOD BY AUTOMATED COUNT: 14.7 % (ref 11–14.5)
GLUCOSE UR STRIP-MCNC: NEGATIVE MG/DL
HCT VFR BLD AUTO: 41.4 % (ref 35–47)
HGB BLD-MCNC: 13.3 G/DL (ref 12–16)
HGB UR QL STRIP: NEGATIVE
KETONES UR STRIP-MCNC: NEGATIVE MG/DL
LEUKOCYTE ESTERASE UR QL STRIP: NEGATIVE
LYMPHOCYTES # BLD AUTO: 2.1 THOU/UL (ref 0.8–4.4)
LYMPHOCYTES NFR BLD AUTO: 25 % (ref 20–40)
MCH RBC QN AUTO: 28.8 PG (ref 27–34)
MCHC RBC AUTO-ENTMCNC: 32.2 G/DL (ref 32–36)
MCV RBC AUTO: 89 FL (ref 80–100)
MICROALBUMIN UR-MCNC: 1.25 MG/DL (ref 0–1.99)
MICROALBUMIN/CREAT UR: 27.6 MG/G
MONOCYTES # BLD AUTO: 0.8 THOU/UL (ref 0–0.9)
MONOCYTES NFR BLD AUTO: 10 % (ref 2–10)
NEUTROPHILS # BLD AUTO: 4.5 THOU/UL (ref 2–7.7)
NEUTROPHILS NFR BLD AUTO: 55 % (ref 50–70)
NITRATE UR QL: NEGATIVE
PH UR STRIP: 7 [PH] (ref 5–8)
PLATELET # BLD AUTO: 291 THOU/UL (ref 140–440)
PMV BLD AUTO: 7.8 FL (ref 7–10)
RBC # BLD AUTO: 4.63 MILL/UL (ref 3.8–5.4)
SP GR UR STRIP: 1.01 (ref 1–1.03)
TSH SERPL DL<=0.005 MIU/L-ACNC: 1.36 UIU/ML (ref 0.3–5)
UROBILINOGEN UR STRIP-ACNC: NORMAL
VIT B12 SERPL-MCNC: >2000 PG/ML (ref 213–816)
WBC: 8.3 THOU/UL (ref 4–11)

## 2020-12-02 ENCOUNTER — COMMUNICATION - HEALTHEAST (OUTPATIENT)
Dept: RHEUMATOLOGY | Facility: CLINIC | Age: 55
End: 2020-12-02

## 2020-12-02 DIAGNOSIS — R76.8 POSITIVE ANA (ANTINUCLEAR ANTIBODY): ICD-10-CM

## 2020-12-08 ENCOUNTER — COMMUNICATION - HEALTHEAST (OUTPATIENT)
Dept: FAMILY MEDICINE | Facility: CLINIC | Age: 55
End: 2020-12-08

## 2021-01-21 ENCOUNTER — AMBULATORY - HEALTHEAST (OUTPATIENT)
Dept: BEHAVIORAL HEALTH | Facility: CLINIC | Age: 56
End: 2021-01-21

## 2021-01-21 ENCOUNTER — OFFICE VISIT - HEALTHEAST (OUTPATIENT)
Dept: BEHAVIORAL HEALTH | Facility: CLINIC | Age: 56
End: 2021-01-21

## 2021-01-21 DIAGNOSIS — F17.200 TOBACCO USE DISORDER: ICD-10-CM

## 2021-01-21 DIAGNOSIS — F33.0 MILD EPISODE OF RECURRENT MAJOR DEPRESSIVE DISORDER (H): ICD-10-CM

## 2021-01-21 ASSESSMENT — ANXIETY QUESTIONNAIRES
4. TROUBLE RELAXING: SEVERAL DAYS
5. BEING SO RESTLESS THAT IT IS HARD TO SIT STILL: SEVERAL DAYS
1. FEELING NERVOUS, ANXIOUS, OR ON EDGE: MORE THAN HALF THE DAYS
2. NOT BEING ABLE TO STOP OR CONTROL WORRYING: MORE THAN HALF THE DAYS
GAD7 TOTAL SCORE: 8
IF YOU CHECKED OFF ANY PROBLEMS ON THIS QUESTIONNAIRE, HOW DIFFICULT HAVE THESE PROBLEMS MADE IT FOR YOU TO DO YOUR WORK, TAKE CARE OF THINGS AT HOME, OR GET ALONG WITH OTHER PEOPLE: SOMEWHAT DIFFICULT
3. WORRYING TOO MUCH ABOUT DIFFERENT THINGS: SEVERAL DAYS
7. FEELING AFRAID AS IF SOMETHING AWFUL MIGHT HAPPEN: NOT AT ALL
6. BECOMING EASILY ANNOYED OR IRRITABLE: SEVERAL DAYS

## 2021-01-21 ASSESSMENT — PATIENT HEALTH QUESTIONNAIRE - PHQ9: SUM OF ALL RESPONSES TO PHQ QUESTIONS 1-9: 12

## 2021-02-01 ENCOUNTER — OFFICE VISIT - HEALTHEAST (OUTPATIENT)
Dept: FAMILY MEDICINE | Facility: CLINIC | Age: 56
End: 2021-02-01

## 2021-02-01 DIAGNOSIS — G89.4 CHRONIC PAIN SYNDROME: ICD-10-CM

## 2021-02-01 DIAGNOSIS — M79.7 FIBROMYALGIA: ICD-10-CM

## 2021-02-01 DIAGNOSIS — E55.9 VITAMIN D DEFICIENCY: ICD-10-CM

## 2021-02-01 DIAGNOSIS — Z00.00 ANNUAL PHYSICAL EXAM: ICD-10-CM

## 2021-02-01 DIAGNOSIS — F17.200 TOBACCO USE DISORDER: ICD-10-CM

## 2021-02-01 DIAGNOSIS — J45.20 MILD INTERMITTENT ASTHMA WITHOUT COMPLICATION: ICD-10-CM

## 2021-02-01 DIAGNOSIS — M85.80 LOW BONE MASS: ICD-10-CM

## 2021-02-01 ASSESSMENT — MIFFLIN-ST. JEOR: SCORE: 1159.27

## 2021-02-03 ENCOUNTER — OFFICE VISIT - HEALTHEAST (OUTPATIENT)
Dept: RHEUMATOLOGY | Facility: CLINIC | Age: 56
End: 2021-02-03

## 2021-02-03 DIAGNOSIS — R76.8 POSITIVE ANA (ANTINUCLEAR ANTIBODY): ICD-10-CM

## 2021-02-03 DIAGNOSIS — R80.9 MICROALBUMINURIA: ICD-10-CM

## 2021-02-03 DIAGNOSIS — G89.29 CHRONIC NECK PAIN: ICD-10-CM

## 2021-02-03 DIAGNOSIS — M54.2 CHRONIC NECK PAIN: ICD-10-CM

## 2021-02-03 DIAGNOSIS — M54.9 UPPER BACK PAIN: ICD-10-CM

## 2021-02-03 DIAGNOSIS — J32.9 RECURRENT SINUS INFECTIONS: ICD-10-CM

## 2021-02-03 DIAGNOSIS — L93.2 CUTANEOUS LUPUS ERYTHEMATOSUS: ICD-10-CM

## 2021-02-03 DIAGNOSIS — M79.7 FIBROMYALGIA: ICD-10-CM

## 2021-02-03 DIAGNOSIS — Z15.89 HLA B27 (HLA B27 POSITIVE): ICD-10-CM

## 2021-02-03 DIAGNOSIS — R20.2 RIGHT LEG PARESTHESIAS: ICD-10-CM

## 2021-02-04 ENCOUNTER — COMMUNICATION - HEALTHEAST (OUTPATIENT)
Dept: RHEUMATOLOGY | Facility: CLINIC | Age: 56
End: 2021-02-04

## 2021-02-08 ENCOUNTER — OFFICE VISIT - HEALTHEAST (OUTPATIENT)
Dept: BEHAVIORAL HEALTH | Facility: CLINIC | Age: 56
End: 2021-02-08

## 2021-02-08 DIAGNOSIS — F17.200 TOBACCO USE DISORDER: ICD-10-CM

## 2021-02-08 DIAGNOSIS — F33.0 MILD EPISODE OF RECURRENT MAJOR DEPRESSIVE DISORDER (H): ICD-10-CM

## 2021-02-12 ENCOUNTER — COMMUNICATION - HEALTHEAST (OUTPATIENT)
Dept: ADMINISTRATIVE | Facility: CLINIC | Age: 56
End: 2021-02-12

## 2021-02-23 ENCOUNTER — AMBULATORY - HEALTHEAST (OUTPATIENT)
Dept: BEHAVIORAL HEALTH | Facility: CLINIC | Age: 56
End: 2021-02-23

## 2021-03-08 ENCOUNTER — OFFICE VISIT - HEALTHEAST (OUTPATIENT)
Dept: BEHAVIORAL HEALTH | Facility: CLINIC | Age: 56
End: 2021-03-08

## 2021-03-08 DIAGNOSIS — F33.0 MILD EPISODE OF RECURRENT MAJOR DEPRESSIVE DISORDER (H): ICD-10-CM

## 2021-03-08 DIAGNOSIS — F17.200 TOBACCO USE DISORDER: ICD-10-CM

## 2021-03-25 ENCOUNTER — COMMUNICATION - HEALTHEAST (OUTPATIENT)
Dept: FAMILY MEDICINE | Facility: CLINIC | Age: 56
End: 2021-03-25

## 2021-03-25 DIAGNOSIS — K91.2 POSTSURGICAL MALABSORPTION: ICD-10-CM

## 2021-03-25 DIAGNOSIS — Z98.84 HISTORY OF ROUX-EN-Y GASTRIC BYPASS: ICD-10-CM

## 2021-03-25 DIAGNOSIS — D50.9 IRON DEFICIENCY ANEMIA, UNSPECIFIED IRON DEFICIENCY ANEMIA TYPE: ICD-10-CM

## 2021-04-18 ENCOUNTER — COMMUNICATION - HEALTHEAST (OUTPATIENT)
Dept: FAMILY MEDICINE | Facility: CLINIC | Age: 56
End: 2021-04-18

## 2021-04-18 DIAGNOSIS — K91.2 POSTSURGICAL MALABSORPTION: ICD-10-CM

## 2021-04-18 DIAGNOSIS — Z98.84 HISTORY OF ROUX-EN-Y GASTRIC BYPASS: ICD-10-CM

## 2021-04-18 DIAGNOSIS — Z72.0 TOBACCO ABUSE: ICD-10-CM

## 2021-04-18 DIAGNOSIS — K21.9 GASTROESOPHAGEAL REFLUX DISEASE WITHOUT ESOPHAGITIS: ICD-10-CM

## 2021-05-13 ENCOUNTER — COMMUNICATION - HEALTHEAST (OUTPATIENT)
Dept: FAMILY MEDICINE | Facility: CLINIC | Age: 56
End: 2021-05-13

## 2021-05-13 DIAGNOSIS — Z98.84 HISTORY OF ROUX-EN-Y GASTRIC BYPASS: ICD-10-CM

## 2021-05-13 DIAGNOSIS — K91.2 POSTSURGICAL MALABSORPTION: ICD-10-CM

## 2021-05-13 DIAGNOSIS — K91.2 POSTOPERATIVE MALABSORPTION: ICD-10-CM

## 2021-05-26 ASSESSMENT — PATIENT HEALTH QUESTIONNAIRE - PHQ9: SUM OF ALL RESPONSES TO PHQ QUESTIONS 1-9: 7

## 2021-05-27 ASSESSMENT — PATIENT HEALTH QUESTIONNAIRE - PHQ9
SUM OF ALL RESPONSES TO PHQ QUESTIONS 1-9: 12
SUM OF ALL RESPONSES TO PHQ QUESTIONS 1-9: 5

## 2021-05-28 ASSESSMENT — ASTHMA QUESTIONNAIRES
ACT_TOTALSCORE: 22
ACT_TOTALSCORE: 20

## 2021-05-28 ASSESSMENT — ANXIETY QUESTIONNAIRES
GAD7 TOTAL SCORE: 7
GAD7 TOTAL SCORE: 8
GAD7 TOTAL SCORE: 10

## 2021-05-31 VITALS — BODY MASS INDEX: 22.86 KG/M2 | HEIGHT: 63 IN | WEIGHT: 129 LBS

## 2021-05-31 VITALS — WEIGHT: 128 LBS | BODY MASS INDEX: 22.68 KG/M2 | HEIGHT: 63 IN

## 2021-06-01 VITALS — WEIGHT: 122 LBS | HEIGHT: 63 IN | BODY MASS INDEX: 21.62 KG/M2

## 2021-06-01 NOTE — TELEPHONE ENCOUNTER
----- Message from Marie Bryan PA-C sent at 10/1/2019  5:36 PM CDT -----  Call:  Her swallow study was normal.    She can continue to monitor her symptoms, or next step would be to have her see ENT or GI.
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
## 2021-06-01 NOTE — PROGRESS NOTES
Referral request received to the [facility] pain center.  Patient indicates wishing to transition from her present pain clinic.  We will inform the patient that we will need records sent from the present clinic to evaluate before determining if we will continue with an appointment.

## 2021-06-01 NOTE — TELEPHONE ENCOUNTER
Called patient and relayed the below message. Patient stated she will hold off for now and will call back if she wants a referral.

## 2021-06-02 VITALS — WEIGHT: 122.5 LBS | HEIGHT: 63 IN | BODY MASS INDEX: 21.71 KG/M2

## 2021-06-03 VITALS
BODY MASS INDEX: 23.25 KG/M2 | DIASTOLIC BLOOD PRESSURE: 72 MMHG | RESPIRATION RATE: 16 BRPM | HEART RATE: 80 BPM | HEIGHT: 63 IN | SYSTOLIC BLOOD PRESSURE: 100 MMHG | WEIGHT: 131.25 LBS

## 2021-06-04 VITALS
HEIGHT: 63 IN | DIASTOLIC BLOOD PRESSURE: 78 MMHG | WEIGHT: 129 LBS | HEART RATE: 64 BPM | BODY MASS INDEX: 22.86 KG/M2 | SYSTOLIC BLOOD PRESSURE: 117 MMHG | RESPIRATION RATE: 18 BRPM

## 2021-06-04 VITALS
BODY MASS INDEX: 24.98 KG/M2 | SYSTOLIC BLOOD PRESSURE: 120 MMHG | RESPIRATION RATE: 20 BRPM | HEART RATE: 60 BPM | WEIGHT: 141 LBS | HEIGHT: 63 IN | DIASTOLIC BLOOD PRESSURE: 74 MMHG

## 2021-06-04 VITALS
HEIGHT: 63 IN | BODY MASS INDEX: 24.1 KG/M2 | TEMPERATURE: 98.3 F | OXYGEN SATURATION: 94 % | DIASTOLIC BLOOD PRESSURE: 71 MMHG | SYSTOLIC BLOOD PRESSURE: 120 MMHG | RESPIRATION RATE: 16 BRPM | WEIGHT: 136 LBS | HEART RATE: 54 BPM

## 2021-06-04 VITALS
WEIGHT: 140 LBS | OXYGEN SATURATION: 98 % | HEART RATE: 54 BPM | DIASTOLIC BLOOD PRESSURE: 80 MMHG | SYSTOLIC BLOOD PRESSURE: 128 MMHG | BODY MASS INDEX: 23.9 KG/M2 | HEIGHT: 64 IN

## 2021-06-04 VITALS — WEIGHT: 137.3 LBS | HEIGHT: 63 IN | BODY MASS INDEX: 24.33 KG/M2

## 2021-06-05 VITALS
SYSTOLIC BLOOD PRESSURE: 134 MMHG | HEIGHT: 63 IN | BODY MASS INDEX: 22.86 KG/M2 | DIASTOLIC BLOOD PRESSURE: 84 MMHG | WEIGHT: 129 LBS | HEART RATE: 64 BPM

## 2021-06-05 VITALS
BODY MASS INDEX: 21.97 KG/M2 | HEIGHT: 63 IN | DIASTOLIC BLOOD PRESSURE: 84 MMHG | HEART RATE: 52 BPM | WEIGHT: 124 LBS | SYSTOLIC BLOOD PRESSURE: 136 MMHG | RESPIRATION RATE: 20 BRPM

## 2021-06-05 VITALS
DIASTOLIC BLOOD PRESSURE: 74 MMHG | WEIGHT: 124 LBS | BODY MASS INDEX: 21.97 KG/M2 | SYSTOLIC BLOOD PRESSURE: 120 MMHG | HEART RATE: 68 BPM

## 2021-06-05 NOTE — PATIENT INSTRUCTIONS - HE
call [phone] to schedule your CT scan  Labs today.  Dietitian visit.  [facility]  Nutritional Guidelines  Gastric Bypass 18 Months Post Op and Beyond    General Guidelines and Helpful Hints:    Eat 3 meals per day + protein supplement(s). No snacks between meals.  o Do not skip meals.  This can cause overeating at the next meal and will prevent adequate protein and nutritional intake.    Aim for 60-80 grams of protein per day.  o Always eat your protein first. This assists with optimal nutrition and helps you stay full longer.  o Depending on your portion size, you may need to drink approved protein supplement between meals to achieve protein goals. Follow recommendations of your Dietitian.     Eat your protein first, and then follow with fiber.   o It is not necessary to count your fiber, but 15-20 grams per day is recommended.    o Add fiber by including fruits, vegetables, whole grains, and beans.     Portions should remain about 1 cup per meal. Use measuring cups to be accurate.    Continue to use saucer/salad plates, infant/toddler silverware to keep portion sizes small and take small bites.    Eat S-L-O-W-L-Y to make each meal last 20-30 minutes. Always stop eating when satisfied.    Continue to use caution with foods containing skins, peels or membranes. Chew well!    Aim for 64 oz. of calorie-free fluids daily.  o Continue to avoid caffeine and carbonation. If you choose to drink alcohol, do so in moderation.   o Remember to avoid drinking during meals, 15-30 minutes before and 30 minutes after.    Exercise is cardona for continued weight loss and weight maintenance. Aim for 30-60 minutes of physical activity most days of the week. Include cardiovascular and strength training.    If having trouble tolerating meat, try using a crock-pot, tinfoil tent, steamer or other moist cooking method to create tender meats. Add broth or low-fat gravy to help meat stay moist.     Avoid high sugar and high  fat foods to prevent dumping syndrome.  o Check nutrition labels for less than 10 grams of sugar and less than 10 grams of fat per serving.    Continue Taking Vitamins/Minerals:  o 8720-2607 mcg of Sublingual B-12 daily  o 1 Multivitamin with Iron twice daily (chewable or swallow tabs)  o 500-600 mg Calcium Citrate twice daily (chewable or swallow tabs)  o 5000 IU Vitamin D3 daily    Sample Grocery List    Protein:    Fat free Greek or light yogurt (less than 10 grams sugar)    Fat free or low-fat cottage cheese    String cheese or reduced fat cheese slices    Tuna, salmon, crab, egg, or chicken salad made with light or fat free mayonnaise    Egg or Egg Substitute    Lean/extra lean turkey, beef, bison, venison (ground, sirloin, round, flank)    Pork loin or tenderloin (grilled, baked, broiled)    Fish such as salmon, tuna, trout, tilapia, etc. (grilled, baked, broiled)    Tender cuts of lean (skinless) turkey or chicken    Lean deli meats: turkey, lean ham, chicken, lean roast beef    Beans such as kidney, garbanzo, black, garcia, or low-fat/fat free refried beans    Peanut butter (natural preferred). Limit to 1 Tbsp. per day.    Low-fat meatloaf (made with lean ground beef or turkey)    Sloppy Joes made with low-sugar ketchup and lean ground beef or turkey    Soy or vegetable protein (i.e. vegan crumbles, soy/veggie burger, tofu)    Hummus    Vegetables:    Fresh: cooked or raw (as tolerated)    Frozen vegetables    Canned vegetables (low sodium or no salt added, rinse before cooking/eating)    (Ok to have skins/peels/membranes/seeds - just chew well)    Fruits:    Fresh fruit    Frozen fruit (no sugar added)    Canned fruit (packed in its own juice, NOT syrup)    (Ok to have skins/peels/membranes/seeds - just chew well)    Starch:    Unsweetened whole-grain hot cereal (or high fiber cold cereal, dry)    Toasted whole wheat bread or [place] Thins    Whole grain crackers    Baked /boiled/mashed potato/sweet  potato    Cooked whole grain pasta, brown rice, or other cooked whole grains    Starchy vegetables: corn, peas, winter squash    Protein Supplement:     Ready to drink protein shake with:  o 15-30 grams protein per serving  o Less than 10 grams total carbohydrate per serving     Protein powder mixed with:  o  Skim or 1% milk  o Low fat or fat free Lactaid milk, plain or no sugar added soymilk  o Water     Fats: (use in moderation)    1 teaspoon of soft tub margarine    1 teaspoon olive oil, canola oil, or peanut oil    1 tablespoon of low-fat kee or salad dressing     Sample Menu for 18+ months after Gastric Bypass    You do NOT need to eat/drink the full portion sizes listed below  Always stop when you are satisfied    Breakfast   cup 1% cottage cheese     cup mixed berries   Lunch 2 oz lean roast beef on   [place] Thin with 1 tsp. light kee    small tomato, chopped, mixed with 1 tsp. light vinaigrette dressing   Supplement Approved protein supplement (if needed between meals)   Dinner 2 oz grilled salmon    cup salad greens with 1 tsp. light salad dressing and 1 tsp. ground flax seed    cup quinoa or brown rice     Breakfast   cup egg substitute with   cup sautéed chopped vegetables  2 light [place] Krisp crackers   Lunch Tuna Melt:   cup tuna mixed with 1 tsp. light kee over   [place] Thin. Top with 2-3 slices cucumber and 1 oz slice of low fat cheese   Supplement 1 cup skim milk (if needed between meals)   Dinner 3 oz  grilled, broiled, or baked seasoned skinless chicken breast    cup asparagus     Breakfast   cup plain oatmeal made with skim or 1% milk with 1 Tbsp. flavored/unflavored protein powder added  1 mozzarella string cheese   Lunch 2 oz deli turkey breast  1/3 cup salad with 1 tsp. light salad dressing, 1/8 of a whole avocado and 1 Tbsp. sunflower seeds   Dinner 3 oz. pork loin made in a crock pot, seasoned with a spice rub    cup cooked carrots   Supplement Approved protein supplement (if needed  between meals)     Breakfast 1 cup breakfast casserole made with egg substitute, turkey sausage,  and steamed, chopped bell peppers   Supplement  1 cup light Greek yogurt (if needed between meals)   Lunch 2 oz. teriyaki turkey    cup mashed sweet potato with 1-2 spritzes of spray butter (like [place])    cup fresh pineapple   Dinner 3 oz low fat meatloaf    cup roasted garlic zucchini     Breakfast   cup leftover breakfast casserole    cup no sugar added applesauce with 1 Tbsp. unflavored protein powder and a sprinkle of cinnamon    Lunch 3 oz shrimp with 1-2 Tbsp. low-sugar cocktail sauce for dipping    c. whole wheat pasta drizzled with   tsp. olive oil   Supplement 1 cup skim/1% milk with scoop of protein powder (if needed between meals)   Dinner Grilled, seasoned kebob with 2 oz lean beef and   cup vegetables     Breakfast Breakfast pizza:   [place] Thin spread with 1 Tbsp. low sugar spaghetti sauce,   cup shredded low fat cheese, melted and 1 slice of [place] andrade     cup fresh fruit mixed with chopped almonds   Lunch   cup black bean soup  4-5 whole grain crackers   Dinner 3 oz  tilapia with lemon pepper seasoning    cup stewed tomatoes   Supplement 1 string cheese (if needed between meals)     Breakfast 2 hard boiled eggs (discard 1 egg yolk)    whole wheat English Muffin with 1 tsp. low sugar jelly   Lunch   cup leftover black bean soup topped with 1-2 Tbsp. low fat cheese  2-3 light [place] Krisp crackers   Supplement Approved protein supplement (if needed between meals)   Dinner 3 oz sirloin steak    cup steamed broccoli

## 2021-06-05 NOTE — PATIENT INSTRUCTIONS - HE
Plan Interventions recommended today:  Assessment tool-        Follow up after the intake packet for behavorial health is completed for medical cannabis certification, OVL at the next visit.     Sign a KELI at the  so we can obtain your records for our next visit- for I-spine.    Please see your current provider for any continued prescription, they may choose to provide you prescriptions of your narcotics until we have your urine screen back. They will continue to manage your general health and have requested you see the pain center for pain management. Please discuss any health concerns with your PCP     [place] Precautions are taken with every patient which includes a  report and drug screen. A UA will be taken today for baseline screening to trend the medical cannabis. If it reflects a lower dose consistently, medical cannabis can be considered.    Behavioral Therapy- if interested in medical cannabis ok to get an intake packet for evaluation- recreational verses self medication for pain.     As discussed the pain center will not manage opioids as well as recreational cannabis as this is diversion.       Patient reminders:   Diagnostics: UDT/SWAB collected 2/4/2020 and results are pending.  UDT/SWAB:  Patient required a random Urine Drug Testing, due to the need to comply with [place] Model Policy Guidelines and CDC Guideline for the use of any controlled substances. This is to ensure that patient is compliant with treatment, and monitor for risks such as diversion, abuse, or any other aberrant behaviors. Patient is either being considered for or taking a controlled substance. Unexpected findings will be discussed and treatment decision may be adjusted. Testing is being implemented across the board randomly w/o bias related to age, race, gender, socioeconomic status or Gnosticist affiliation.     SAFETY REMINDERS  No alcohol while taking controlled substances. Alcohol is not an illegal  substance, it is unsafe to use in combination. It is a build up of substances in the body that can be extremely hazardous and may cause respirations to slow to a dangerous rate resulting in hospitalization, brain damage, or death.    Opioid medications have been associated with sharp rise in unintentional overdose and death.  Overdose is a condition characterized by the consumption in excess of a particular drug causing adverse effects. This can happen b/c you are sick, accidentally or intentionally took an extra dose, are on multiple medication that can interact. Someone took your medication and they are not use to the medication.  Symptoms of overdose include:   !breathing slow and shallow, erratic or not at all  !pinpoint pupils, hallucinations  !confusion  !muscle jerks, slack muscles   !extreme sleepiness or loss of alertness   !awake but not able to talk   !face pale or clammy, vomiting, for lighter skinned people, the skin tone turns bluish purple, for darker skinned people, it turns grayish or ashen   If in a situation where overdose is a concern engage the emergency response system (dial 911).    In one study it was noted that 80% of unintentional overdoses occurred in people who were taking a combination of opioids and benzodiazepines.    Do not sell, loan, borrow or share your opioid medication with anyone. Deaths have occurred as a result of this practice. It is illegal and patients are being prosecuted.     Prevent unexpected access/loss of medication: Keep medication locked. Only carry what you need with you.    The patient agrees to the plan and has no further questions, if questions arise the patient knows to call [phone].       Vickie Rinaldi, [facility]  [address]  [place] [address]  Ph: [phone]  Fax: [phone]

## 2021-06-05 NOTE — PROGRESS NOTES
Outcome Facilitation Team (OFT) round progress note     Patient: Glenis Chapman, 87 year old       Admit date:8/9/2020    Living situation : Living Arrangements: Alone  Type of residence : Type of Residence: Other (comment)  Payor: MEDICARE / Plan: PARTA AND B / Product Type: MEDICARE    Needs Assessment:   IV fluids/rate appropriate - Yes  IV infusions No  IV antibiotics required - Yes  Invasive line(s) PIV (Peripheral IV)  Telemetry needs -    Oxygen - O2 Device: Nasal cannula (08/10/20 0800)  O2 Flow Rate (L/min): 2 L/min(is pt baseline) (08/10/20 0800)  Therapy needs - No   Last bowel movement -  ,    Tan present -   N/A  Isolation - No active isolations  Adequate pain control - Yes        Barriers to discharge: not medically ready for discharge and need to complete IV antibiotics     8/10 Baseline O2 noted, IVF and IV ABX noted, plan return to  Apartment when med clear, surgery following,               Pharmacy discussion/recommendations: N/A    Discharge planning:     Anticipated/Expected discharge      Destination at discharge: Home:     Team members present: physician, nurse, , , pharmacist and nurse educator/nurse manager     Patient presents to the clinic today for a follow up with Vickie Rinaldi CNP regarding consult for Fibromyalgia and Lupus. Patient describes their pain as tight throbbing. Her/His function score is 7.

## 2021-06-05 NOTE — PROGRESS NOTES
"  Subjective:     Bart Kraft is a 54 y.o. female who presents for an annual exam.     Other concerns today:  1.  Mental health concerns.  She says New Year's Ольга she had a \"nervous breakdown.\"  She says that she cannot get her head right.  She feels depressed.  There is a lot of stress in her home.  She is currently living with grandchildren and a few of her older children.  She says is often hard to concentrate.  She would like to see a therapist.  She is not interested in a psychiatrist or medication.    2.  Postmenopausal bleeding.  She says up until recently, she has not had any bleeding or spotting for 2 years.  She had a period for 4 days in November.  Then had some more bleeding from a 12/24/2019 to 1/3/2020.  She has no concern for STD infections.    She is due to see Dr. Merchant at [facility] for her regular gastric bypass checkup.  She is wondering if she has bowels that are moving around, because she often has pain in her abdomen.  She will follow-up with him regarding this concern.  She had a flareup of her lupus with a rash on the left side of her face.  This is the first time she is had a flareup in 10 years.  She would like to see impact massage therapy for treatment of her fibromyalgia.  She feels like she has gained weight recently.  Ankles feel swollen.    ACT = 22    Immunization History   Administered Date(s) Administered     Td, Adult, Absorbed 08/16/2007       Gynecologic History  Patient's last menstrual period was 12/25/2019.  Contraception: post menopausal status  Last Pap: unknown  Last mammogram: unknown    OB History   No obstetric history on file.         Current Outpatient Medications:      albuterol (PROAIR HFA;PROVENTIL HFA;VENTOLIN HFA) 90 mcg/actuation inhaler, Inhale 2 puffs every 4 (four) hours as needed for wheezing., Disp: 1 Inhaler, Rfl: 1     amitriptyline (ELAVIL) 10 MG tablet, Take 1 tablet (10 mg total) by mouth at bedtime., Disp: 90 tablet, Rfl: 3     ergocalciferol " (VITAMIN D2) 1,250 mcg (50,000 unit) capsule, Take 1 capsule (50,000 Units total) by mouth 2 (two) times a week., Disp: 24 capsule, Rfl: 3     ferrous fumarate 325 mg (106 mg iron) Tab, Take 325 mg by mouth daily., Disp: 90 tablet, Rfl: 3     gabapentin (NEURONTIN) 400 MG capsule, TK 2 CS PO 3 TO 4 TIMES PER DAY, Disp: 180 capsule, Rfl: 3     omeprazole (PRILOSEC) 20 MG capsule, Take 1 capsule (20 mg total) by mouth daily., Disp: 90 capsule, Rfl: 3     Oxycodone HCl 20 mg Tab, Take 1 tablet by mouth every 4 (four) hours as needed., Disp: , Rfl: 0  Past Medical History:   Diagnosis Date     Intestinal obstruction (H) 5/20/2008     Low bone mass 7/19/2018     Lupus (H)      Mild intermittent asthma 9/19/2019     Past Surgical History:   Procedure Laterality Date     CHOLECYSTECTOMY  05/10/2003     ESOPHAGOGASTRODUODENOSCOPY N/A 8/4/2015    Procedure: ESOPHAGOGASTRODUODENOSCOPY w/ biopsy by forceps;  Surgeon: Feliciano Grant MD;  Location[facility];  Service:      REVISION OF PREVIOUS RNY GASTRIC BYPASS  4/14/2010    w/total division of the stomach, redo of gastrojejunostomy and lysis of adhesions     ZULEMA-EN-Y PROCEDURE  5/11/2004    MD Francisca     TONSILLECTOMY       TUBAL LIGATION       Augmentin [amoxicillin-pot clavulanate] and Penicillins  Family History   Problem Relation Age of Onset     Brain cancer Mother      No Medical Problems Father      No Medical Problems Brother      Breast cancer Maternal Aunt      Throat cancer Maternal Grandmother      Lung cancer Maternal Grandmother      Diabetes Paternal Grandmother      Rheum arthritis Paternal Grandmother      Breast cancer Paternal Grandmother      Social History     Socioeconomic History     Marital status:      Spouse name: Not on file     Number of children: Not on file     Years of education: Not on file     Highest education level: Not on file   Occupational History     Not on file   Social Needs     Financial resource strain: Not on file      Food insecurity:     Worry: Not on file     Inability: Not on file     Transportation needs:     Medical: Not on file     Non-medical: Not on file   Tobacco Use     Smoking status: Current Every Day Smoker     Packs/day: 1.50     Years: 35.00     Pack years: 52.50     Types: Cigarettes     Smokeless tobacco: Never Used   Substance and Sexual Activity     Alcohol use: No     Drug use: No     Sexual activity: Not on file   Lifestyle     Physical activity:     Days per week: Not on file     Minutes per session: Not on file     Stress: Not on file   Relationships     Social connections:     Talks on phone: Not on file     Gets together: Not on file     Attends Sabianism service: Not on file     Active member of club or organization: Not on file     Attends meetings of clubs or organizations: Not on file     Relationship status: Not on file     Intimate partner violence:     Fear of current or ex partner: Not on file     Emotionally abused: Not on file     Physically abused: Not on file     Forced sexual activity: Not on file   Other Topics Concern     Not on file   Social History Narrative     Not on file       Review of Systems  Complete Review of Systems is discussed with patient and is negative except as noted in HPI.    Objective:     Vitals:    01/20/20 0855   BP: 120/74   Pulse: 60   Resp: 20     Body mass index is 24.98 kg/m .    Physical Exam:  General: Patient is alert and Oriented x 3, in no apparent distress.  Appropriately groomed with normal affect.  HEENT, Thyroid, Lymphatic, Cardiac, Pulmonary, GI, Musculoskeletal, and Neuro exams were completed today and grossly normal.  Breast Exam: Declines.  Genitourinary Exam: External genitalia is normal in appearance, vaginal walls are healthy and without lesions, no significant discharge noted in the vaginal vault, cervix is well visualized and normal in appearance.  Pap was taken without difficulty.    Results for orders placed or performed in visit on  01/20/20   Wet Prep, Vaginal   Result Value Ref Range    Yeast Result No yeast seen No yeast seen    Trichomonas No Trichomonas seen No Trichomonas seen    Clue Cells, Wet Prep No Clue cells seen No Clue cells seen   Lipid Cascade   Result Value Ref Range    Cholesterol 159 <=199 mg/dL    Triglycerides 121 <=149 mg/dL    HDL Cholesterol 47 (L) >=50 mg/dL    LDL Calculated 88 <=129 mg/dL    Patient Fasting > 8hrs? Yes    Glucose   Result Value Ref Range    Glucose 93 70 - 99 mg/dL    Patient Fasting > 8hrs? Yes    HM1 (CBC with Diff)   Result Value Ref Range    WBC 9.5 4.0 - 11.0 thou/uL    RBC 4.31 3.80 - 5.40 mill/uL    Hemoglobin 12.2 12.0 - 16.0 g/dL    Hematocrit 37.7 35.0 - 47.0 %    MCV 87 80 - 100 fL    MCH 28.2 27.0 - 34.0 pg    MCHC 32.2 32.0 - 36.0 g/dL    RDW 14.8 (H) 11.0 - 14.5 %    Platelets 204 140 - 440 thou/uL    MPV 7.9 7.0 - 10.0 fL    Neutrophils % 62 50 - 70 %    Lymphocytes % 22 20 - 40 %    Monocytes % 9 2 - 10 %    Eosinophils % 7 (H) 0 - 6 %    Basophils % 1 0 - 2 %    Neutrophils Absolute 5.9 2.0 - 7.7 thou/uL    Lymphocytes Absolute 2.1 0.8 - 4.4 thou/uL    Monocytes Absolute 0.9 0.0 - 0.9 thou/uL    Eosinophils Absolute 0.6 (H) 0.0 - 0.4 thou/uL    Basophils Absolute 0.1 0.0 - 0.2 thou/uL   Vitamin B12   Result Value Ref Range    Vitamin B-12 >2,000 (H) 213 - 816 pg/mL   Other labs pending.    Assessment and Plan:     1. Annual physical exam  Health Maintenance discussed with patient as appropriate for age and risk factors.  Last Pap was completed about 3 years? ago.  New Pap done today due to postmenopausal bleeding.  Last mammogram done 2 years? ago.  New referral placed today.  She reports she had a negative Cologuard 1 year ago.  Previous records have been requested from [facility].  I will review all of these records today and update chart as appropriate.  She declines HIV screening.  She says she thinks she has had a tetanus shot within the past few years.  She declines flu  shot.  - Mammo Screening Bilateral; Future  - Lipid Cascade  - Glucose  - Gynecologic Cytology (PAP Smear)    2. Post-menopausal bleeding  Primary concern is to rule out endometrial cancer.  I will follow-up with pending labs.  Pelvic ultrasound ordered.  Consider referral to OB/GYN if appropriate.  I will follow-up with all results.  - Wet Prep, Vaginal  - Chlamydia trachomatis & Neisseria gonorrhoeae, Amplified Detection  - HM1(CBC and Differential)  - HM1 (CBC with Diff)  - US Pelvis With Transvaginal Non OB; Future    3. Mild intermittent asthma without complication  Well-controlled on present medication.  - albuterol (PROAIR HFA;PROVENTIL HFA;VENTOLIN HFA) 90 mcg/actuation inhaler; Inhale 2 puffs every 4 (four) hours as needed for wheezing.  Dispense: 1 Inhaler; Refill: 1    4. Vitamin D deficiency  I will follow-up with lab results.  She is not currently taking vitamin D.  - Vitamin D, Total (25-Hydroxy)    5. Depression, unspecified depression type  6. Family history of stress  She would like to see a counselor.  She declines medication.  She declines referral to psychiatry.  Patient denies any thoughts of wanting to hurt him/herself or others and does contract for safety.  - Ambulatory referral to Psychology    7. History of Scar-en-Y gastric bypass  8. Postsurgical malabsorption  Labs pending today.  She will follow-up with her surgeon soon for annual checkup.  - Vitamin B12  - omeprazole (PRILOSEC) 20 MG capsule; Take 1 capsule (20 mg total) by mouth daily.  Dispense: 90 capsule; Refill: 3  - ferrous fumarate 325 mg (106 mg iron) Tab; Take 325 mg by mouth daily.  Dispense: 90 tablet; Refill: 3  - ergocalciferol (VITAMIN D2) 1,250 mcg (50,000 unit) capsule; Take 1 capsule (50,000 Units total) by mouth 2 (two) times a week.  Dispense: 24 capsule; Refill: 3    9. Fibromyalgia  10. Lupus (H)  Along with rheumatology.  Referral placed for impact massage therapy per patient's request.  - Ambulatory referral to  PT/OT  - amitriptyline (ELAVIL) 10 MG tablet; Take 1 tablet (10 mg total) by mouth at bedtime.  Dispense: 90 tablet; Refill: 3  - gabapentin (NEURONTIN) 400 MG capsule; TK 2 CS PO 3 TO 4 TIMES PER DAY  Dispense: 180 capsule; Refill: 3    11. Other chronic pain  Seeing pain clinic.  They manage her oxycodone prescription.    12. Low bone mass  Previously noted in old records.  Patient states she had a DEXA scan 1 year ago.  It was she was told it was good and she did not need another DEXA scan for 2 years.    13. Iron deficiency anemia, unspecified iron deficiency anemia type  I will follow-up with pending labs.  - ferrous fumarate 325 mg (106 mg iron) Tab; Take 325 mg by mouth daily.  Dispense: 90 tablet; Refill: 3    14. Tobacco dependence  Patient is under increased stress.  Now would not necessarily be a good time to quit smoking.  Will revisit at a later date.    15. Zinc deficiency  Lab ordered for this with history of deficiency and gastric bypass.  However could not be added on.  Consider doing this in the future if needed.  Could also have this evaluated with surgeons.    16. Gastroesophageal reflux disease without esophagitis  Well-controlled on present medication.  - omeprazole (PRILOSEC) 20 MG capsule; Take 1 capsule (20 mg total) by mouth daily.  Dispense: 90 capsule; Refill: 3      The following high BMI interventions were performed this visit: encouragement to exercise and lifestyle education regarding diet    This dictation uses voice recognition software, which may contain typographical errors.

## 2021-06-05 NOTE — PROGRESS NOTES
Bariatric Follow Up Visit with a History of Previous Bariatric Surgery     Date of visit: 1/28/2020  Physician: Gill Marcelino MD  Primary Care Provider:  Marie Bryan PA-C Koral M Paul   54 y.o.  female    Date of Surgery: 5/11/2004  Initial Weight: 276#  Initial BMI:   Today's Weight:   Wt Readings from Last 1 Encounters:   01/28/20 140 lb (63.5 kg)     Body mass index is 24.03 kg/m .      Assessment and Plan     Assessment: Bart is a 54 y.o. year old female who is almost 16 yrs s/p  Scar en Y Gastric Bypass with Dr. Shonda Kraft feels as if she has achieved the goals she hoped to accomplish through bariatric surgery and weight loss.  She is an ongoing smoker and has had anastomotic ulcer controlled with high dose omeprazole.    Encounter Diagnoses   Name Primary?     History of Scar-en-Y gastric bypass      Postsurgical malabsorption      Gastroesophageal reflux disease without esophagitis      Tobacco abuse Yes     Abdominal pain, left lower quadrant      Low bone mass      Post-menopausal          Current Outpatient Medications:      albuterol (PROAIR HFA;PROVENTIL HFA;VENTOLIN HFA) 90 mcg/actuation inhaler, Inhale 2 puffs every 4 (four) hours as needed for wheezing., Disp: 1 Inhaler, Rfl: 1     amitriptyline (ELAVIL) 10 MG tablet, Take 1 tablet (10 mg total) by mouth at bedtime., Disp: 90 tablet, Rfl: 3     ergocalciferol (VITAMIN D2) 1,250 mcg (50,000 unit) capsule, Take 1 capsule (50,000 Units total) by mouth 2 (two) times a week., Disp: 24 capsule, Rfl: 3     gabapentin (NEURONTIN) 400 MG capsule, TK 2 CS PO 3 TO 4 TIMES PER DAY, Disp: 180 capsule, Rfl: 3     omeprazole (PRILOSEC) 20 MG capsule, Take 2 capsules (40 mg total) by mouth 2 (two) times a day before meals., Disp: 180 capsule, Rfl: 3     Oxycodone HCl 20 mg Tab, Take 1 tablet by mouth every 4 (four) hours as needed., Disp: , Rfl: 0     ferrous fumarate 325 mg (106 mg iron) Tab, Take 325 mg by mouth daily., Disp: 90 tablet,  Rfl: 3    Plan:Discussed mechanical pain of internal hernia, intussusception, volvulus. Other possibilityies-urinary, bowel.   CT abdomen pelvis, Labs with add UA and lipase. Encouraged tobacco cessation. She refused call it quits enrollment.   We enrolled her last year. Encouraged B-12 in lieu of 5 hour energy drinks. Dietitian visit would be helpful. DEXA to f/u LBM.    Return for Next scheduled follow up.    Bariatric Surgery Review     Interim History/LifeChanges: Now has custody of 4 of her grandchilden. 10 yo, 5 yo and twin 1 yo. Still smoking 1 ppd.   Her  is a smoker as well. She has had LLQ abdominal pain for the past 6 months, getting more frequent and more severe. Unrelated to eating, no urinary symptoms, constipation or diarrhea. No fevers or chills. No vomiting. She is taking her PPI high dose to oppose her tobacco use in light of marginal ulcer history.  She had a physical and is up to date with her cologuard which was normal by her report. Her DEXA showed LBM.    Eating well. Vitamin intake is sporadic. She sleeps 2-5 hours. Stress is high. She is down to 2 5 hour energy drinks daily. Was taking 3-4/x.      Patient Concerns: LLQ  Appetite (1-10): average  GERD: on high dose PPI    Medication changes: sporadic    Vitamin Intake:   B-12   2 5 hour energy drinks    MVI  1/d   Vitamin D  has a green one 2/wk   Calcium   foods     Other                LABS: ordered    Nausea yes  Vomiting no  Constipation no  Diarrhea no  Rashes no  Hair Loss yes  Calf tenderness no  Breathing difficulty no  Reactive Hypoglycemia yes  Light Headedness no   Moods stable-depression and will be talking to a therapist    12 point ROS as above and otherwise negative      Habits:  Alcohol: no  Tobacco: 1ppd  Caffeine 2 5 hour energy  NSAIDS : no  Exercise Routine: none  3 meals/day yes  Protein first no  ? grams/day  Water Separate from meals yes  Calorie Containing Beverages OJ or other juice occ Sprite  Restaurant  eating/wk 3/wk   Sleeping 5  Stress high  CPAP: no  Contraception: PM    Social History     Social History     Socioeconomic History     Marital status:      Spouse name: Not on file     Number of children: Not on file     Years of education: Not on file     Highest education level: Not on file   Occupational History     Not on file   Social Needs     Financial resource strain: Not on file     Food insecurity:     Worry: Not on file     Inability: Not on file     Transportation needs:     Medical: Not on file     Non-medical: Not on file   Tobacco Use     Smoking status: Current Every Day Smoker     Packs/day: 1.50     Years: 35.00     Pack years: 52.50     Types: Cigarettes     Smokeless tobacco: Never Used   Substance and Sexual Activity     Alcohol use: No     Drug use: No     Sexual activity: Not on file   Lifestyle     Physical activity:     Days per week: Not on file     Minutes per session: Not on file     Stress: Not on file   Relationships     Social connections:     Talks on phone: Not on file     Gets together: Not on file     Attends Spiritism service: Not on file     Active member of club or organization: Not on file     Attends meetings of clubs or organizations: Not on file     Relationship status: Not on file     Intimate partner violence:     Fear of current or ex partner: Not on file     Emotionally abused: Not on file     Physically abused: Not on file     Forced sexual activity: Not on file   Other Topics Concern     Not on file   Social History Narrative     Not on file       Past Medical History     Past Medical History:   Diagnosis Date     Intestinal obstruction (H) 5/20/2008     Low bone mass 7/19/2018     Lupus (H)      Mild intermittent asthma 9/19/2019     Problem List     Patient Active Problem List   Diagnosis     Vitamin D deficiency     Zinc deficiency     Tobacco dependence     Iron deficiency anemia     Postsurgical malabsorption     History of Scar-en-Y gastric bypass      "Low bone mass     Lupus (H)     Other chronic pain     Fibromyalgia     Menopausal syndrome (hot flashes)     Mild intermittent asthma     Post-menopausal bleeding     Medications     Current Outpatient Medications   Medication Sig Note     albuterol (PROAIR HFA;PROVENTIL HFA;VENTOLIN HFA) 90 mcg/actuation inhaler Inhale 2 puffs every 4 (four) hours as needed for wheezing.      amitriptyline (ELAVIL) 10 MG tablet Take 1 tablet (10 mg total) by mouth at bedtime.      ergocalciferol (VITAMIN D2) 1,250 mcg (50,000 unit) capsule Take 1 capsule (50,000 Units total) by mouth 2 (two) times a week.      gabapentin (NEURONTIN) 400 MG capsule TK 2 CS PO 3 TO 4 TIMES PER DAY      omeprazole (PRILOSEC) 20 MG capsule Take 2 capsules (40 mg total) by mouth 2 (two) times a day before meals.      Oxycodone HCl 20 mg Tab Take 1 tablet by mouth every 4 (four) hours as needed. 6/26/2018: Received from: External Pharmacy Received Sig: TK 1 T PO Q 4 TO 6 H PRF MODERATE TO SEVERE P     ferrous fumarate 325 mg (106 mg iron) Tab Take 325 mg by mouth daily.      Surgical History     Past Surgical History  She has a past surgical history that includes Scar-en-y procedure (5/11/2004); REVISION OF PREVIOUS RNY GASTRIC BYPASS (4/14/2010); Tonsillectomy; Esophagogastroduodenoscopy (N/A, 8/4/2015); Tubal ligation; and Cholecystectomy (05/10/2003).    Objective-Exam     Constitutional:  /80   Pulse (!) 54   Ht 5' 4\" (1.626 m)   Wt 140 lb (63.5 kg)   SpO2 98%   BMI 24.03 kg/m    Height: 5' 4\" (1.626 m) (1/28/2020 12:55 PM)  Weight: 140 lb (63.5 kg) (1/28/2020 12:55 PM)  BMI (Calculated): 24 (1/28/2020 12:55 PM)  SpO2: 98 % (1/28/2020 12:55 PM)    General:  Pleasant and in no acute distress   Eyes:  EOMI  ENT:  Airway 1+  Moist mucous membranes  Neck:  Supple, No LAD, No thyromegaly, No carotid bruits appreciated  Respiratory: Normal respiratory effort, no cough, wheezes or crackles  CV:  Regular rate and Rhythm,2/6 systolic murmurs, " pulses 1+, no calf tenderness, no LE edema  Gastrointestinal: Abdomen NT/ND, BS+  Musculoskeletal: muscle mass WNL  Skin: color fair hair pulled back, incisions nicely healed-verical midline incision  Neurological: No tremor, normal gait  Psychiatric: alert and oriented X3, mood and affect normal    Counseling     We reviewed the important post op bariatric recommendations:  -eating 3 meals daily  -eating protein first, getting >60gm protein daily  -eating slowly, chewing food well  -avoiding/limiting calorie containing beverages  -drinking water 15-30 minutes before or after meals  -choosing wheat, not white with breads, crackers, pastas, jazmine, bagels, tortillas, rice  -limiting restaurant or cafeteria eating to twice a week or less    We discussed the importance of restorative sleep and stress management in maintaining a healthy weight.  We discussed the National Weight Control Registry healthy weight maintenance strategies and ways to optimize metabolism.  We discussed the importance of physical activity including cardiovascular and strength training in maintaining a healthier weight.    We discussed the importance of life-long vitamin supplementation and life-long  follow-up.    Bart was reminded that, to avoid marginal ulcers she should avoid tobacco at all, alcohol in excess, caffeine in excess, and NSAIDS (unless indicated for cardioprotection or othewise and opposed by a PPI).    Gill Marcelino MD, FAAFP  [facility].  1/28/2020  1:13 PM      No images are attached to the encounter.   30 minutes spent with patient. >50% in counseling and coordination of care.
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
## 2021-06-06 NOTE — PROGRESS NOTES
Assessment/Plan:         Bart was seen today for bladder pressure.    Diagnoses and all orders for this visit:    Acute cystitis without hematuria  -     ciprofloxacin HCl (CIPRO) 500 MG tablet; Take 1 tablet (500 mg total) by mouth 2 (two) times a day for 7 days.    Urinary frequency  -     Urine,Microscopic        The patient's symptoms and UA suggest incompletely treated UTI. No symptoms to suggest pyelonephritis, urosepsis - no fevers. Nothing on history to suggest alternative diagnoses such as STD/PID, stone, gonadal torsion, urethritis.    She is appropriate for outpatient antibiotic therapy. I have prescribed ciprofloxacin based on previous sensitivity of this UTI as above for the patient. Discussed hydration, use of probiotics while on antibiotics.     Urine culture is pending, patient aware that they will only be contacted should culture result necessitate a change in treatment plan.    Discussed warning signs for immediate follow up including fevers/chills, nausea, vomiting, hematuria, abdominal pain, altered mental status. I educated the patient to otherwise follow up with primary care doctor as needed or if symptoms do not improve.     Plan of care was discussed with the patient and/or guardian. They verbalize understanding of the treatment options and plan of care.    Angela Owens           Subjective:        Bart Kraft is a 54 y.o. female who presents for UTI symptoms.  Started 2 weeks ago.  Was seen 2/10, had UA showing e.coli, pansensitive. Was put on keflex. Had some improvement of her symptoms but no complete resolution and now seem to be returning.   Strong odor, very frequent urination, burning with  Urination, tugging/irritated lower abdominal sensation when urinating.  No nausea, vomiting, diarrhea, fever, flank pain. No abdominal pain except when urinating.   History of intermittent UTIs  Allergy to cillins.         Objective:       Blood pressure 120/71, pulse (!) 54, temperature 98.3  " F (36.8  C), temperature source Oral, resp. rate 16, height 5' 3\" (1.6 m), weight 136 lb (61.7 kg), SpO2 94 %, not currently breastfeeding.   Gen: Well appearing, no acute distress.   Abdomen: Soft, no pain with palpation, no CVA tenderness     Results for orders placed or performed in visit on 02/21/20   Urine,Microscopic   Result Value Ref Range    Bacteria, UA Few (!) None Seen hpf    RBC, UA 3-5 (!) None Seen, 0-2 hpf    WBC, UA 25-50 (!) None Seen, 0-5 hpf    Squam Epithel, UA 0-5 None Seen, 0-5 lpf    WBC Clumps Present (!) None Seen              "

## 2021-06-07 NOTE — PROGRESS NOTES
Phone call to patient for scheduled telemed visit with psychotherapist. A man answered the phone and stated she is not available at this time.   Patient not home or with phone at times of scheduled psychotherapy appointment. No other number to contact patient with at this time. Cami DOMÍNGUEZ

## 2021-06-07 NOTE — TELEPHONE ENCOUNTER
Called and spoke with Bart Kraft, Message was given, Bart Kraft understood, no further questions. Scheduled patient for face 2 face visit w/

## 2021-06-07 NOTE — PROGRESS NOTES
"  Mental Health Tele Visit Note    Patient: Bart Kraft    : 1965 MRN: [id number]    Date: 4/3/2020   Start time: 11:21am   Stop Time: 11:42am   Session # 2    The patient has been notified of the following:   \"We have found that certain health care needs can be provided without the need for a face to face visit.  This service lets us provide the care you need with a phone conversation.  I will have full access to your [facility] medical record during this entire phone call.   I will be taking notes for your medical record. Since this is like an office visit, we will bill your insurance company for this service.  There are potential benefits and risks of telephone visits (e.g. limits to patient confidentiality) that differ from in-person visits.?  Confidentiality still applies for telephone services, and nobody will record the visit.  It is important to be in a quiet, private space that is free of distractions (including cell phone or other devices) during the visit.?? If during the course of the call I believe a telephone visit is not appropriate, you will not be charged for this service\"  Consent has been obtained for this service by care team member: Yes, per verbal agreement     Session Type: Patient is participating in a telephone visit due to coronavirus pandemic. Patient and provider present for follow up session.    Chief Complaint   Patient presents with     MH Follow Up     coping with increased stress due to finances and health concerns        New symptoms or complaints: None    Functional Impairment:   Personal: 3  Family: 2  Work: 4  Social:4          ASSESSMENT: Current Emotional / Mental Status (status of significant symptoms):              Risk status (Self / Other harm or suicidal ideation)              Patient {denies personal safety concerns              Patient denies current or recent suicidal ideation or behaviors.              Patient denies current or recent homicidal ideation or " behaviors.              Patient denies current or recent self injurious behavior or ideation.              Patient denies other safety concerns.              Patient denies changes in risk factors              Patient denies changes in risk factors              Recommended that patient call 911 or go to the local ED should there be a change in any of these risk factors.                Attitude:                                   Cooperative               Orientation:                             x4              Speech                          Rate / Production:       Normal                           Volume:                       Normal               Mood:                                      Anxious  Normal              Thought Content:                    Clear               Thought Form:                        Coherent  Logical               Insight:                                     Good       Patient's impression of their current status: Financial stress in household due to impact of coronavirus. Worries.  Trying to meet basic needs such as food. Care for everyone else first. Feels she needs time for herself this weekend.  Arthritis pain is bothersome. Recently had a fall.   Sleeping well.     Therapist impression of patients current state: Patient presented for follow up individual psychotherapy visit via phone visit. This is patient's second visit with provider. A standard DA still needs to be completed. Patient was alert, oriented and engaged in session. We continue to work on building a trusting therapeutic relationship. Patient presents with symptoms of anxiety and increased stress due to the impact of the coronavirus pandemic.  Therapist was able to provide resources for food access including [employer] and [employer]  the [place] [employer] [facility] is offering. Explored ways she can care for herself.      Type of psychotherapeutic technique provided: Insight oriented, Client centered  and Solution-focused    Progress toward short term goals: Progress as expected, building therapeutic relationship. Patient is openly discussing concerns and coping strategies during tele-sessions.     Review of long term goals: Not done at today's visit.  Long-term goals will be established during future sessions after completion of diagnostic assessment.       Diagnosis:  1. Adjustment disorder with mixed anxiety and depressed mood    2. Tobacco use disorder    3. Stressful life events affecting family and household          Plan and Follow up: Patient is scheduled for follow up psychotherapy on 4/15/2020.  Prior to next session: access food from resources provided today. Increase self care efforts.   Patient to continue participating in tele-therapy until face-to-face meetings are allowed. We will continue to work towards capability for video visits.     Discharge Criteria/Planning: Patient will continue with follow-up until therapy can be discontinued without return of signs and symptoms.      I have reviewed the note as documented above.  This accurately captures the substance of my conversation with the patient.  Cami Shaffer, JENNSW  4/3/2020
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
## 2021-06-07 NOTE — PROGRESS NOTES
Sent Video Visit link to patient and she did not join the visit. Called patient and left a VM for patient about scheduled virtual follow up visit today. Noted she does not have a future appointment scheduled with this provider. She was encouraged to call and schedule a future appointment with provider. No show for video visit today. Cami DOMÍNGUEZ

## 2021-06-08 NOTE — PROGRESS NOTES
Assessment/ Plan  1. Traumatic hematoma of buttock, subsequent encounter  But getting smaller slowly.  Discussed potential sequelae of infected hematoma which should not be subtle if it happens and would be fairly rare as well as calcification which would be more likely but not as bothersome.  Discussed that I do not think this merits intervention since it is slowly improving.  Also, with pandemic, would be considered semi-/elective.  She wholeheartedly agrees, does not wish to intervene   2. Mild intermittent asthma without complication  Renewal only  - albuterol (PROAIR HFA;PROVENTIL HFA;VENTOLIN HFA) 90 mcg/actuation inhaler; Inhale 2 puffs every 4 (four) hours as needed for wheezing.  Dispense: 1 Inhaler; Refill: 1    3. History of Scar-en-Y gastric bypass  Renewal only  - ergocalciferol (VITAMIN D2) 1,250 mcg (50,000 unit) capsule; Take 1 capsule (50,000 Units total) by mouth 2 (two) times a week.  Dispense: 24 capsule; Refill: 3  - PNV,calcium 72-iron,carb-folic (PRENATAL PLUS) 29 mg iron- 1 mg Tab; Take 1 tablet by mouth daily.  Dispense: 100 tablet; Refill: 3    4. Postoperative malabsorption    - ergocalciferol (VITAMIN D2) 1,250 mcg (50,000 unit) capsule; Take 1 capsule (50,000 Units total) by mouth 2 (two) times a week.  Dispense: 24 capsule; Refill: 3    5. Postsurgical malabsorption    - ergocalciferol (VITAMIN D2) 1,250 mcg (50,000 unit) capsule; Take 1 capsule (50,000 Units total) by mouth 2 (two) times a week.  Dispense: 24 capsule; Refill: 3    6. Fibromyalgia  Renewal  - gabapentin (NEURONTIN) 400 MG capsule; TK 2 CS PO 3 TO 4 TIMES PER DAY  Dispense: 180 capsule; Refill: 3    Body mass index is 22.85 kg/m .    Subjective  CC:  Chief Complaint   Patient presents with     Fall     swollen right buttocks     Mass     right buttocks     HPI:  54-year-old female with history of chronic pain, obesity, status post Scar-en-Y gastric bypass presents after a fall that occurred 3/21/2020 down her stairs.   She was carrying a child when she slipped and bounced down all of the stairs.  He had severe pain in her hip and buttock.  Was seen in urgent care.  X-ray showed question of possible pubic symphysis fracture, CT, if clinically appropriate, recommended.  She did not want to do this given the pandemic.    She complained at the time of pain in her hip and groin as well as a huge lump, the size of a cantaloupe, over her right buttock that was painful.  She notes that the pain in the hip gradually improved and is now resolved.  She continues to have a smaller mass in her buttock which is been getting smaller slowly though it seemed to slow in its resolution.  She still has significant pain in that site.  She notes that at no time is a gotten any bigger.  She notes that the bruising around it has improved somewhat.  She is somewhat worried that she may be missing something by not following up with a lump.          Patient Active Problem List   Diagnosis     Vitamin D deficiency     Zinc deficiency     Tobacco dependence     Iron deficiency anemia     Postsurgical malabsorption     History of Scar-en-Y gastric bypass     Low bone mass     Lupus (H)     Other chronic pain     Fibromyalgia     Menopausal syndrome (hot flashes)     Mild intermittent asthma     Post-menopausal bleeding     Chronic pain syndrome     Subacute cutaneous lupus erythematosus     Current medications reviewed as follows:  Current Outpatient Medications on File Prior to Visit   Medication Sig     hydrOXYzine pamoate (VISTARIL) 25 MG capsule TK 1 TO 2 CS PO UP TO QID FOR 4 DAYS PRN     omeprazole (PRILOSEC) 20 MG capsule Take 2 capsules (40 mg total) by mouth 2 (two) times a day before meals.     oxyCODONE (ROXICODONE) 10 mg immediate release tablet      [DISCONTINUED] albuterol (PROAIR HFA;PROVENTIL HFA;VENTOLIN HFA) 90 mcg/actuation inhaler Inhale 2 puffs every 4 (four) hours as needed for wheezing.     [DISCONTINUED] ergocalciferol (VITAMIN D2)  "1,250 mcg (50,000 unit) capsule Take 1 capsule (50,000 Units total) by mouth 2 (two) times a week.     [DISCONTINUED] gabapentin (NEURONTIN) 400 MG capsule TK 2 CS PO 3 TO 4 TIMES PER DAY     [DISCONTINUED] PNV,calcium 72/iron,carb/folic (PRENATAL PLUS ORAL) Take by mouth daily.     amitriptyline (ELAVIL) 10 MG tablet Take 1 tablet (10 mg total) by mouth at bedtime.     ferrous fumarate 325 mg (106 mg iron) Tab Take 325 mg by mouth daily.     medroxyPROGESTERone (PROVERA) 10 MG tablet      Oxycodone HCl 20 mg Tab Take 1 tablet by mouth every 4 (four) hours as needed.     phenazopyridine HCl (AZO URINARY PAIN RELIEF ORAL) Take by mouth.     No current facility-administered medications on file prior to visit.      Social History     Tobacco Use   Smoking Status Current Every Day Smoker     Packs/day: 1.50     Years: 35.00     Pack years: 52.50     Types: Cigarettes   Smokeless Tobacco Never Used     Social History     Social History Narrative     Not on file     Patient Care Team:  Marie Bryan PA-C as PCP - General (Physician Assistant)  Marie Bryan PA-C as Assigned PCP  ROS  Denies fever, denies numbness, tingling      Objective  Physical Exam  Vitals:    05/06/20 1305   BP: 117/78   Patient Site: Right Arm   Patient Position: Sitting   Cuff Size: Adult Regular   Pulse: 64   Resp: 18   Weight: 129 lb (58.5 kg)   Height: 5' 3\" (1.6 m)     About 6 x 6 [place] firm mass over right gluteal region.  Mildly tender, no bruising, no erythema or warmth.  Gait is normal.  Diagnostics  None    Please note: Voice recognition software was used in this dictation.  It may therefore contain typographical errors.      "

## 2021-06-10 NOTE — PROGRESS NOTES
"  Mental Health Tele Visit Note    Patient: Bart Kraft    : 1965 MRN: [id number]    Date: 2020   Start time: 1:09pm   Stop Time: 1:48pm   Session # 3    The patient has been notified of the following:   \"We have found that certain health care needs can be provided without the need for a face to face visit.  This service lets us provide the care you need with a phone conversation.  I will have full access to your [facility] medical record during this entire phone call.   I will be taking notes for your medical record. Since this is like an office visit, we will bill your insurance company for this service.  There are potential benefits and risks of telephone visits (e.g. limits to patient confidentiality) that differ from in-person visits.?  Confidentiality still applies for telephone services, and nobody will record the visit.  It is important to be in a quiet, private space that is free of distractions (including cell phone or other devices) during the visit.?? If during the course of the call I believe a telephone visit is not appropriate, you will not be charged for this service\"  Consent has been obtained for this service by care team member: Yes, per verbal agreement     Session Type: Patient is participating in a telephone visit due to coronavirus pandemic. Patient and provider present for follow up session.    Chief Complaint   Patient presents with     MH Follow Up     coping with anxiety and depressed mood.       New symptoms or complaints: None    Functional Impairment:   Personal: 3  Family: 2  Work: 4  Social:4          ASSESSMENT: Current Emotional / Mental Status (status of significant symptoms):              Risk status (Self / Other harm or suicidal ideation)              Patient {denies personal safety concerns              Patient denies current or recent suicidal ideation or behaviors.              Patient denies current or recent homicidal ideation or behaviors.              Patient " denies current or recent self injurious behavior or ideation.              Patient denies other safety concerns.              Patient denies changes in risk factors              Patient denies changes in risk factors              Recommended that patient call 911 or go to the local ED should there be a change in any of these risk factors.                Attitude:                                   Cooperative               Orientation:                             x4              Speech                          Rate / Production:       Normal                           Volume:                       Normal               Mood:                                      Anxious  Depressed               Thought Content:                    Clear               Thought Form:                        Coherent  Logical               Insight:                                     Good       Patient's impression of their current status:   Pain concerns. Fatigue. Needing to rest a lot. Lupus concerns.   Seeing specialist soon. Feel down at times. Visit mom for social support and for self-care. Overwhelmed with family things at times.   She expresses a desire to commit to regular therapy sessions.    Therapist impression of patients current state: Patient presented for follow up individual psychotherapy visit via phone visit. She has not followed up with this therapist over the past 4 months. Therefore, provider did not feel able to complete a diagnostic assessment today as we were re-establishing care after not meeting for 4 months. She continues to present with anxious thoughts and depressed mood. Her physical health conditions greatly impact her mental health symptoms. She was open and engaged in session today.    Type of psychotherapeutic technique provided: Insight oriented, Client centered and CBT    Progress toward short term goals: Progress as expected, in regards to improved self care.    Poor progress in regards to maintaining  regular follow up with therapist.     Review of long term goals: Not done at today's visit.  Long-term goals will be established during future sessions after completion of diagnostic assessment.       Diagnosis:  1. Adjustment disorder with mixed anxiety and depressed mood          Plan and Follow up: Patient is scheduled for follow up psychotherapy on 8/28/2020.  Prior to next session: listen to body and tune to it's needs.  Improve therapy attendance.   Provider to complete a diagnostic assessment in future .     Discharge Criteria/Planning: Patient will continue with follow-up until therapy can be discontinued without return of signs and symptoms.      I have reviewed the note as documented above.  This accurately captures the substance of my conversation with the patient.  Cami Shaffer, [facility]  8/7/20
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
## 2021-06-11 NOTE — PROGRESS NOTES
Subjective:    Bart Kraft is a 54 y.o. female who presents with chief complaint of blood pressure check.  1.  Blood pressure check  Her blood pressures have generally been normal.  She does not check them at home.  However for the past few visits when she has been in pain clinic, blood pressures have been elevated.  Provider recommended she follow-up with us.  I am unfortunately unable to review any of her blood pressures.  BP Readings from Last 3 Encounters:   09/04/20 136/84   05/06/20 117/78   02/21/20 120/71   With further discussion, she thinks that at least a few of the times when she is seen the pain clinic she had taken a 5-hour energy drink prior to blood pressure check.    2.  Numbness in leg.  She has had numbness in the right lateral leg from the knee to the ankle joint.  Is been off and on for 6 months.  Worsening recently and now numbness is constant.  She says it feels somewhat like when your foot falls asleep or when you have treated in your mouth during a dental procedure.  No recent leg, back, or hip injuries.  No other focal neuro deficits or numbness or tingling elsewhere in the body.  She also notes occasional foot drop.    3.  GERD.  She is taking omeprazole 2-3 times a day.  She has not had insurance covering this.  She has a fairly regular appointment with her gastric bypass surgeon to follow-up on those associated needs.  The past she has had her stomach checked and been told she has ulcers.  She was to continue with omeprazole this way.    4.  Rheumatology follow-up.  She met with rheumatology within the past few days.  He had multiple labs and x-rays that they wanted ordered.  She is hoping to get that done today.    5.  Cough.  For the past 2 weeks she has had what she thinks is a cough or cold.  It started out to seem like a sinus infection.  She has had a chronic cough for the past 2 weeks.  She thinks it is getting better.  He does not think it is COVID, but her family is concerned  about this.  She would like to be tested for COVID today.    Patient Active Problem List   Diagnosis     Vitamin D deficiency     Zinc deficiency     Tobacco dependence     Iron deficiency anemia     Postsurgical malabsorption     History of Scar-en-Y gastric bypass     Low bone mass     Lupus (H)     Other chronic pain     Fibromyalgia     Menopausal syndrome (hot flashes)     Mild intermittent asthma     Post-menopausal bleeding     Chronic pain syndrome     Subacute cutaneous lupus erythematosus     Mild episode of recurrent major depressive disorder with anxious distress     Tobacco use disorder       Current Outpatient Medications:      albuterol (PROAIR HFA;PROVENTIL HFA;VENTOLIN HFA) 90 mcg/actuation inhaler, Inhale 2 puffs every 4 (four) hours as needed for wheezing., Disp: 1 Inhaler, Rfl: 1     cyclobenzaprine (FLEXERIL) 10 MG tablet, Take 1 tab p.o. qhs (if feeling groggy the following morning, can try taking half a tablet instead or can take earlier the night before)., Disp: 30 tablet, Rfl: 2     ergocalciferol (VITAMIN D2) 1,250 mcg (50,000 unit) capsule, Take 1 capsule (50,000 Units total) by mouth 2 (two) times a week., Disp: 24 capsule, Rfl: 3     ferrous fumarate 325 mg (106 mg iron) Tab, Take 325 mg by mouth daily., Disp: 90 tablet, Rfl: 3     gabapentin (NEURONTIN) 400 MG capsule, TK 2 CS PO 3 TO 4 TIMES PER DAY, Disp: 180 capsule, Rfl: 3     medroxyPROGESTERone (PROVERA) 10 MG tablet, , Disp: , Rfl:      omeprazole (PRILOSEC) 20 MG capsule, Take 2 capsules (40 mg total) by mouth 2 (two) times a day before meals., Disp: 180 capsule, Rfl: 3     oxyCODONE (ROXICODONE) 10 mg immediate release tablet, Take 10 mg by mouth every 6 (six) hours as needed. , Disp: , Rfl:      Oxycodone HCl 20 mg Tab, Take 1 tablet by mouth every 4 (four) hours as needed., Disp: , Rfl: 0     phenazopyridine HCl (AZO URINARY PAIN RELIEF ORAL), Take by mouth., Disp: , Rfl:      PNV,calcium 72-iron,carb-folic (PRENATAL PLUS) 29  "mg iron- 1 mg Tab, Take 1 tablet by mouth daily., Disp: 100 tablet, Rfl: 3     amitriptyline (ELAVIL) 10 MG tablet, Take 1 tablet (10 mg total) by mouth at bedtime. (Patient taking differently: Take 10 mg by mouth at bedtime as needed. ), Disp: 90 tablet, Rfl: 3     hydrOXYzine pamoate (VISTARIL) 25 MG capsule, TK 1 TO 2 CS PO UP TO QID FOR 4 DAYS PRN, Disp: , Rfl:      Objective:   Allergies:  Augmentin [amoxicillin-pot clavulanate] and Penicillins    Vitals:  Vitals:    09/04/20 1338 09/04/20 1345   BP: 151/84 136/84   Patient Site: Left Arm Left Arm   Patient Position: Sitting Sitting   Cuff Size: Adult Regular Adult Regular   Pulse: (!) 52    Resp: 20    Weight: 124 lb (56.2 kg)    Height: 5' 3\" (1.6 m)      Body mass index is 21.97 kg/m .    Vital signs reviewed.  General: Patient is alert and oriented x 3, in no apparent distress  Cardiac: regular rate and rhythm, no murmurs  Pulmonary: lungs clear to auscultation bilaterally, no crackles, rales, rhonchi, or wheezing noted  Musculoskeletal: No lower extremity edema bilaterally, sensation is decreased in the right lateral leg as compared to the medial right leg, full active range of motion of right foot ankle and toes, normal pedal pulse in the right foot, no obvious gross abnormalities with the right lower extremity      Assessment and Plan:   1. Blood pressure elevated without history of HTN  History of reported elevated blood pressure at pain clinic.  All of her blood pressures here recently have been normal.  Patient is wondering if drinking an energy drink prior to having her blood pressures checked at pain clinic with elevated them.  She is generally feeling well.  We discussed the possibility of starting a low-dose blood pressure medicine, or continuing to monitor.  She would like to continue to monitor.  I reviewed she should stop drinking energy drinks.  She makes it sound unlikely that she will follow this advice.    2. Right leg numbness  3. Right " foot drop  Concern for neurological issue.  Differential includes lower extremity muscle or neuro issue, pinched nerve from spine, versus other.  No back pain.  No other obvious focal neuro deficits.  - Ambulatory referral to Neurology    4. Cough  Most likely viral URI.  Patient is a smoker.  Symptoms are improving.  Her family is concerned, given the current pandemic, that she may have COVID.  She is not too concerned, but would like a test today.  I completed the test myself today.  She will self isolate until test results are back.  We had multiple other things planned today including shots, labs, x-ray.  However since there is some concern for COVID, will defer these until she has a negative test.  - Symptomatic COVID-19 Virus (CORONAVIRUS) PCR  - COVID-19 Virus PCR MRF    5. Gastroesophageal reflux disease without esophagitis  6. History of Scar-en-Y gastric bypass  7. Postsurgical malabsorption  Continue with omeprazole as before.  Follow-up with concerns.  She is following with bariatric center regularly as directed.  - omeprazole (PRILOSEC) 20 MG capsule; Take 2 capsules (40 mg total) by mouth 2 (two) times a day before meals.  Dispense: 180 capsule; Refill: 3  - PNV,calcium 72-iron,carb-folic (PRENATAL PLUS) 29 mg iron- 1 mg Tab; Take 1 tablet by mouth daily.  Dispense: 100 tablet; Refill: 3    8. Mild intermittent asthma without complication  Generally well controlled.  ACT = 20.  Continue present medications.  She is a smoker.  - albuterol (PROAIR HFA;PROVENTIL HFA;VENTOLIN HFA) 90 mcg/actuation inhaler; Inhale 2 puffs every 4 (four) hours as needed for wheezing.  Dispense: 1 Inhaler; Refill: 1    9. Tobacco abuse  In the pre-contemplative phase for quitting.    10. Rheumatology Follow-up.  Patient has numerous labs and x-rays ordered by rheumatology.  She was unfortunately able to get unable to get those completed today due to concern for COVID.  She will return at her earliest convenience next week,  after negative test for lab and x-ray only appointment.    11. Health Maintenance  Mammogram ordered today.  Mammogram tech was not here today so could not be completed today.  Also deferred due to possible pelvic infection.  Patient agrees to Tdap and Pneumovax today.  Will defer these shots temporarily until she returns next week for lab/x-ray/nurse visit.  - Mammo Screening Bilateral; Future    This dictation uses voice recognition software, which may contain typographical errors.

## 2021-06-11 NOTE — PROGRESS NOTES
Bart Kraft who presents today with a chief complaint of  Discoid Lupus, arthritis, Fibromyalgia    Previously said [facility]- last seen in 2013- notes are in chart    Joint Pains: Yes  Location: neck, shoulders, elbows, hands/fingers   Onset: Chronic  Intensity: 7-8 /10  AM Stiffness: 60 Minutes  Alleviating/Aggravating Factors: shower/hot water  Medications helpful? yes  Tolerating Meds: Yes  Other: PT, walking      ROS:  Patient denies having: persistent dry eyes, dry mouth, recurrent oral ulcers, patchy alopecia, active rashes, photosensitivity*- occasional rash on face- discoid rash , history of psoriasis, active chest pain, active shortness of breath, active cough*- uses an inhaler, active dysuria, history of kidney stones, active abdominal pain, active diarrhea, history of hematochezia, active dysphagia, history of peptic ulcer disease*- takes omeprazole- previous ulcers, history of HIV, tuberculosis, hepatitis B or C, Lyme disease, seizure history, raynaud's, active documented fevers, recent infections, difficulty sleeping or chronic unrefreshing sleep* occasional with depression, involuntary weight loss, loss of appetite, excessive fatigue* due to depression, depression*-seeing therapist at PCP clinic, anxiety* with depression,  recurrent sinus infections* 3-4 per year, history of inflammatory eye diseases (such as uveitis, scleritis, iritis, etc).     Denies any history of miscarriages.    Information gathered by medical assistant incorporated into this note, was reviewed and discussed with the patient.    Problem List:  Patient Active Problem List   Diagnosis     Vitamin D deficiency     Zinc deficiency     Tobacco dependence     Iron deficiency anemia     Postsurgical malabsorption     History of Scar-en-Y gastric bypass     Low bone mass     Lupus (H)     Other chronic pain     Fibromyalgia     Menopausal syndrome (hot flashes)     Mild intermittent asthma     Post-menopausal bleeding      Chronic pain syndrome     Subacute cutaneous lupus erythematosus        PMH:   Past Medical History:   Diagnosis Date     Arthritis      Intestinal obstruction (H) 5/20/2008     Low bone mass 7/19/2018     Mild intermittent asthma 9/19/2019     Neuromuscular disorder (H)      Stomach ulcer      Subacute cutaneous lupus erythematosus        Surgical History:  Past Surgical History:   Procedure Laterality Date     CHOLECYSTECTOMY  05/10/2003     ESOPHAGOGASTRODUODENOSCOPY N/A 8/4/2015    Procedure: ESOPHAGOGASTRODUODENOSCOPY w/ biopsy by forceps;  Surgeon: Feliciano Grant MD;  Location: [facility];  Service:      REVISION OF PREVIOUS RNY GASTRIC BYPASS  4/14/2010    w/total division of the stomach, redo of gastrojejunostomy and lysis of adhesions     ZULEMA-EN-Y PROCEDURE  5/11/2004    MD Francisca     TONSILLECTOMY       TUBAL LIGATION         Family History:  Family History   Problem Relation Age of Onset     Brain cancer Mother      No Medical Problems Father      No Medical Problems Brother      Breast cancer Maternal Aunt      Throat cancer Maternal Grandmother      Lung cancer Maternal Grandmother      Cancer Maternal Grandmother      Diabetes Paternal Grandmother      Rheum arthritis Paternal Grandmother      Breast cancer Paternal Grandmother      Cancer Paternal Grandmother      Kidney disease Paternal Grandmother      Heart attack Maternal Grandfather        Social History:   reports that she has been smoking cigarettes. She has a 52.50 pack-year smoking history. She has never used smokeless tobacco. She reports that she does not drink alcohol or use drugs.    Allergies:  Allergies   Allergen Reactions     Augmentin [Amoxicillin-Pot Clavulanate] Nausea And Vomiting     Penicillins Nausea And Vomiting        Current Medications:  Current Outpatient Medications   Medication Sig Dispense Refill     albuterol (PROAIR HFA;PROVENTIL HFA;VENTOLIN HFA) 90 mcg/actuation inhaler Inhale 2 puffs every 4 (four) hours  "as needed for wheezing. 1 Inhaler 1     amitriptyline (ELAVIL) 10 MG tablet Take 1 tablet (10 mg total) by mouth at bedtime. 90 tablet 3     ergocalciferol (VITAMIN D2) 1,250 mcg (50,000 unit) capsule Take 1 capsule (50,000 Units total) by mouth 2 (two) times a week. 24 capsule 3     ferrous fumarate 325 mg (106 mg iron) Tab Take 325 mg by mouth daily. 90 tablet 3     gabapentin (NEURONTIN) 400 MG capsule TK 2 CS PO 3 TO 4 TIMES PER  capsule 3     hydrOXYzine pamoate (VISTARIL) 25 MG capsule TK 1 TO 2 CS PO UP TO QID FOR 4 DAYS PRN       medroxyPROGESTERone (PROVERA) 10 MG tablet        omeprazole (PRILOSEC) 20 MG capsule Take 2 capsules (40 mg total) by mouth 2 (two) times a day before meals. 180 capsule 3     oxyCODONE (ROXICODONE) 10 mg immediate release tablet        Oxycodone HCl 20 mg Tab Take 1 tablet by mouth every 4 (four) hours as needed.  0     phenazopyridine HCl (AZO URINARY PAIN RELIEF ORAL) Take by mouth.       PNV,calcium 72-iron,carb-folic (PRENATAL PLUS) 29 mg iron- 1 mg Tab Take 1 tablet by mouth daily. 100 tablet 3     No current facility-administered medications for this visit.            Physical Exam:  There were no vitals taken for this visit.       Bart Kraft is a 54 y.o. female who is being evaluated via a billable video visit.      The patient has been notified of following:     \"This video visit will be conducted via a call between you and your physician/provider. We have found that certain health care needs can be provided without the need for an in-person physical exam.  This service lets us provide the care you need with a video conversation.  If a prescription is necessary we can send it directly to your pharmacy.  If lab work is needed we can place an order for that and you can then stop by our lab to have the test done at a later time.    Video visits are billed at different rates depending on your insurance coverage. Please reach out to your insurance provider with any " "questions.    If during the course of the call the physician/provider feels a video visit is not appropriate, you will not be charged for this service.\"    Patient has given verbal consent to a Video visit? Yes  How would you like to obtain your AVS? AVS Preference: Mail a copy.  If dropped by the video visit, the video invitation should be sent to: Text to cell phone: [phone]  Will anyone else be joining your video visit? No- daughter will help get video set up      Video-Visit Details    Type of service:  Video Visit    Following up today via video visit, per Covid-19 pandemic requirements.    Verbal consent has been obtained for this service by care team member.    Video call start time: 11:12 AM    Video call end time: 12:02 PM    D[employer] utilized for video call.    Phone number utilized: [phone]    Patient location for video visit: Home     Provider location for video visit:  Home (working remotely)      Summary/Assessment:    Pleasant 54-year-old female with pertinent past medical history of cutaneous lupus and fibromyalgia.    Patient presents today to become established with a rheumatologist.    Patient states that she was established with rheumatology in the past, last seen in 2013 by Dr. Tova Pham(note reviewed in our system).  Patient treated at that time for subcutaneous cutaneous lupus and arthritis.  Patient treated with Plaquenil and hydrocortisone cream for her face.    Patient states that her lupus mainly manifested with rashes involving face and chest.  Was rash free for a number of years (since 2013) up until December 2019 had a 2-week flare and another 2-week flare in February 2020.  Both of these episodes responded again to hydrocortisone cream.    Patient had gastric bypass surgery in 2004 which also benefited her cutaneous lupus.    States her skin lupus was confirmed by skin biopsy.  Denies having previous organ involvement.    Believes her cutaneous lupus first diagnosed in " 1997.    Other symptoms associated with her lupus include photosensitivity, arthralgias and fatigue.  Adds notices that stress can sometimes exacerbate her symptoms.    Patient also diagnosed with fibromyalgia about 6 years ago.    Admits to having chronic nonrestorative sleep and to having component of anxiety/depression, sees a counselor.    Has chronic joint pains involving her neck, shoulders, elbows (left greater than right) and hands.      Denies having joint swelling at this time.    States her paternal grandmother had RA.    Was on Plaquenil for a number of years, discontinued about 6 to 7 years ago due to stability.  When flaring in the past has also responded to prednisone.    For arthralgias takes Tylenol 3 tablets daily with some partial benefit.    Avoids traditional oral NSAIDs due to gastric bypass.  Already tried Celebrex in the past.    Patient also established with pain clinic, has some oxycodone which she plans on discontinuing in September 2020.    Has tried physical therapy and massage therapy in the past with partial benefit.    Patient is very active overseeing her grandchildren, has custody of 4 of her grandkids ranging in age between 3 and 10.    Also complains of having right distal lower extremity paresthesias that come and go over the past 6 months.  Patient is nondiabetic.  Denies regular alcohol consumption..    Denies trying other DMARDs.      No history of miscarriages.    For now patient's cutaneous lupus appears to be stable.    Regarding chronic arthralgias/myalgias, likely has component of fibromyalgia contributing.  May also have early degenerative joint disease playing a role.  We will correlate with labs and x-rays performed.    Please see below for management plan.      Pertinent rheumatology/past medical history (please refer to above for more detailed history):      Subcutaneous cutaneous lupus (face, chest, ? Discoid history per  patient)    Photosensitivity    Fibromyalgia    Chronic arthralgias (elbows, hands, shoulders)    Chronic neck pain    History gastric bypass    Right distal lower extremity paresthesias    Recurrent sinus infections    Postmenopausal (> 2 yrs)    Iron deficiency anemia    Mild asthma    Osteopenia    Tobacco use/smoker    Positive family history for RA (grandmother)      Rheumatology medications provided/suggested:    Flexeril  Hydrocortisone 1% as needed (OTC)      Pertinent medication from other providers or from otc (please refer to above for more detailed med list):    Tylenol  Gabapentin  Iron supplements  Vitamin D (50K qwk regularly)  Elavil  Prilosec    Pertinent medications already tried:     Plaquenil  Prednisone  Chantix      Pertinent lab history:      Pertinent imaging/test history:    7/2018 bone density test consistent with having osteopenia with non-elevated frax score.      Other:    , has 4 children.  Used to work as a .  Currently has custody of 4 of her grandkids ranging in age from 3-10.    Denies regular alcohol consumption.    Positive tobacco use.    Plan:      For now patient's cutaneous lupus has been stable for several months, agrees to monitor for now.  If experiences resurfacing symptoms will consider restarting Plaquenil.    We will add Flexeril to act as a muscle relaxant hopefully improve her sleep.    Suggested modifying Tylenol intake from 3 tablets at once daily to 2 tablets twice a day as needed.    Will avoid oral NSAIDs given history of gastric bypass.    For pain not responsive to Tylenol continue following up with pain clinic.    Made aware that if has resurfacing rash involving face/chest can obtain/repeat OTC hydrocortisone 1% cream to be utilized twice daily for up to 7 days.    Continue utilizing sunscreen with SPF greater than 70% over sun exposed areas when outdoors.    Will refer to neurology for distal right lower extremity paresthesias.    Will obtain  x-rays of the cervical spine, elbows and hands.    Will obtain several autoimmune/inflammatory markers and correlate clinically.    Follow-up in 2 to 3 months.      Procedure note:      Spent 60 minutes with greater than half of this time spent with the patient going over differential diagnosis, prognosis, treatment plan, medication side effects and  answering questions.    Major side effect profile of medications provided/suggested were discussed with the patient.    This note was transcribed using Dragon voice recognition software as a result unintentional grammatical errors or word substitutions may have occurred. Please contact our Rheumatology department if you need any clarification or if you have any related inquiries.    Thank you for referring this patient to our clinic.      Roland Diaz D.O. ....................  9/1/2020   8:50 AM

## 2021-06-11 NOTE — PROGRESS NOTES
Outpatient Mental Health Treatment Plan    Name:  Bart Kraft  :  1965  MRN:  [id number]    Treatment Plan:  Initial Treatment Plan  Date Treatment Plan Initiated:  2020  Intake/initial treatment plan date:   Benefit and risks and alternatives have been discussed: Yes    Plan:    Initial Treatment will focus on: Depressed Mood - learn and implement cognitive reframing skills; implement time for self care; implement healthy boundaries in relationships.  and Anxiety - learning and implementing healthy coping skills for stress rather than smoking cigarettes.  Accepting health conditions and changes and coping with pain.    ? Depression    Goal:  Decrease average depression level from 3 to 1.   Strategies:    ?[x] Decrease social isolation     [x] Increase involvement in meaningful activities     ?[x] Discuss sleep hygiene     ?[x] Explore thoughts and expectations about self and others     ?[x] Process grief (loss of significant person, independence, role,        etc.)     ?[x] Assess for suicide risk     ?[x] Implement physical activity routine (with physician approval)     [x] Consider introduction of bibliotherapy and/or videos     [x] Continue compliance with medical treatment plan (or explore         barriers)       ?    Degree to which this is a problem (1-4): 3  Degree to which goal is met (1-4)1  Date of Review: -2020          ?   ? Anxiety    Goal:  Decrease average anxiety level from 3 to 1.   Strategies: ? [x]Learn and practice relaxation techniques and other coping        strategies (e.g., thought stopping, reframing, meditation)     ? [x] Increase involvement in meaningful activities     ? [x] Discuss sleep hygiene     ? [x] Explore thoughts and expectations about self and others     ? [x] Identify and monitor triggers for panic/anxiety symptoms     ? [x] Implement physical activity routine (with physician approval)     ? [x] Consider introduction of bibliotherapy  and/or videos     ? [x] Continue compliance with medical treatment plan (or explore          barriers)                                           Degree to which this is a problem (1-4): 3  Degree to which goal is met (1-4)1  Date of Review: 09/18/220-12/18/2020      Chronic pain/Fatigue  Goal:  ? Decrease average pain level from 7/10 to 3/10.   ? Increase % acceptance of pain from 65 to 80.   Strategies: ? [x] Explore thoughts and expectations about self and others    [x] Explore emotional reactions to illness/injury      ? [x] Learn and practice relaxation techniques and other coping          strategies            [x] Implement physical activity routine (with physician approval)     ? [x] Engage in values clarification and goal-setting     ? [x] Consider introduction of bibliotherapy and/or videos     ? [x] Increase involvement in meaningful activities     ? [x] Discuss sleep hygiene     ? [x] Process grief (loss of significant person, independence, role,          etc.)     ? [x] Assess for suicide risk     ?  Degree to which this is a problem (1-4): 4  Degree to which goal is met (1-4)1  Date of Review: 09/18/220-12/18/2020    Functional Impairment: 1=Not at all/Rarely  2=Some days  3=Most Days  4=Every Day   Personal: 3  Family: 2  Work: 4  Social:4      Diagnosis:  1. Mild episode of recurrent major depressive disorder with anxious distress     Tobacco Use Disorder (F17.200)  R/O: 300.02 (F41.1) Generalized Anxiety Disorder due to symptoms of anxiety.     Strengths:  commitment to health and well being, family support and insight.   Limitations:  financial hardship and physical chester concerns.   Cultural Considerations: , English speaking female. Accepting of Western medicine and has established primary care at the [facility]. She is open to mental health therapy- it is not a new concept for her.  Contextual influences on patient's health include: Family Factors - stress with children and having  custody of some of her grandchildren and Health- Seeking Factors pain concerns (fibromyalgia, arthritis) and chronic health conditions including Lupus that impact her daily life and her mood.   Family involvement - No- Patient did not request family involvement at this time.       Anticipated intensity of services:  Every 2 weeks    Estimated duration of treatment:  20 sessions  Necessity for frequency: This frequency is needed to establish therapeutic goals and for continuity of care in order to monitor progress.   Necessity for treatment: To address cognitive, behavioral, and/or emotional barriers in order to work toward goals and to improve quality of life.   Is this treatment appropriate with minimal intrusion/restrictions: yes    Persons responsible for this plan:  ? [x] Patient ? [x] Provider ? [x] Other: ___coordinate care with PCP as appropriate_______________      Provider: SANG Lagunas  Date:  9/18/2020

## 2021-06-11 NOTE — PROGRESS NOTES
13 yrs post op lab orders placed for patient in preparation for appointment with  in June.    Azul Blackburn RN, [facility]  P [phone]  F [phone]

## 2021-06-11 NOTE — PROGRESS NOTES
Bariatric Care Clinic Follow Up Visit for Previous Bariatric Surgery   Date of visit: 6/26/2017  Physician: Conor Sam MD  Primary Care is Feliciano Shaffer MD.  Bart Kraft   51 y.o.  female    Date of Surgery: Revised her 04 RNY due to G-G fistula in 2010  Initial Weight: 252 lbs  Initial BMI: 44  Today's Weight:   Wt Readings from Last 1 Encounters:   06/30/16 132 lb (59.9 kg)     There is no height or weight on file to calculate BMI.  No Data Recorded     Assessment and Plan   Assessment: Bart is a 51 y.o. year old female who is 7 years s/p  revised RNY gastric bypass with Dr. Merchant.  She has had a durable weight loss of 120 lbs since surgery.  Overall compliance with the [facility] has been poor in the past (smoking and NSAID use with lack of vitamin use in the past), last visit in June of 2016 she was at 132 lbsand had multiple vitamin deficiencies, today,  She's interested in smoking cessation and an Rx for chantix was written. She'll set a quit date for late July.  Encouraged to take the proper vitamin regimen, she had labs drawn today and we'll call her with results next week. Since she hadn't been taking at D, twice weekly D2 has been sent to her pharmacy. Undoubtedly some of her bone pain is related to low levels and suspected elevations of her PTH.  .  Bart Kraft feels that she has achieved her   preoperative goals for bariatric surgery.    Plan:  1. Set a quit date for smoking cessation, start Chantix 3 weeks prior and increase up to 2 tabs twice daily.  Follow up in August as planned with Dr. Shaffer.  2. Continue your carafate and prilosec.  Smoking cessation will reduce the chance of severe ulcers.  3. Start prenatal twice daily to boost iron and vitamin levels.  4. Start twice weekly D2 to get vitamin D up so your bones don't weaken/hurt as much.  5. Release of [facility] EGD report needed given discreptency between verbal and written report she received (told she had a GJA  ulcer).     No diagnosis found.    No Follow-up on file.     Bariatric Surgery Review   Interim History/LifeChanges: had an EGD with [facility], final report needed as received a verbal report of a GJA ulcer but her summary document said everything was normal, we'll get the op note to review.  On carafate and Omeprazole.    Patient Concerns: no    Medication changes: on carafate.    Vitamin Intake:   Multivitamin   once daily.   Vitamin D  none.    Calcium  no   Vit. B-12    5 hr energy shots so stopped her B12 as was high.     Habits:            Alcohol Intake  no   NSAID Use  avoiding.   Caffeine Use  no pop.    Exercise  walks 1-2 miles daily in the Summer. Gets PT   CPAP Use:  no   Birth Control  na                                            LABS: drawn and pending      LABS:  Lab Results   Component Value Date    WBC 9.4 05/31/2016    HGB 10.7 (L) 05/31/2016    HCT 35.3 05/31/2016    MCV 83 05/31/2016     05/31/2016      Lab Results   Component Value Date    NIUVNUDK13RN 29.2 (L) 05/31/2016    No results found for: HGBA1C   Lab Results   Component Value Date    CHOL 193 05/31/2016    Lab Results   Component Value Date    PTH 43 05/31/2016         Lab Results   Component Value Date    FERRITIN 7 (L) 05/31/2016      Lab Results   Component Value Date    HDL 46 (L) 05/31/2016      Lab Results   Component Value Date    KELOMMQS09 >2000 (H) 05/31/2016    Lab Results   Component Value Date    31899 145 05/31/2016      Lab Results   Component Value Date    LDLCALC 114 05/31/2016    No results found for: TSH Lab Results   Component Value Date    FOLATE 16.0 05/31/2016      Lab Results   Component Value Date    TRIG 165 (H) 05/31/2016    Lab Results   Component Value Date    ALT 15 05/31/2016    AST 21 05/31/2016    ALKPHOS 82 05/31/2016    BILITOT 0.4 05/31/2016    No results found for: TESTOSTERONE     No components found for: CHOLHDL Lab Results   Component Value Date    7597 47.7 05/31/2016      @resfast(vitamin  "a: 1)@          Patient Profile   Social History     Social History Narrative        Past Medical History   Past Medical History:   Diagnosis Date     Lupus      Patient Active Problem List   Diagnosis     Vitamin D deficiency     Zinc deficiency     Tobacco dependence     Low serum vitamin D     Iron deficiency anemia     Postsurgical malabsorption     History of Scar-en-Y gastric bypass     Current Outpatient Prescriptions   Medication Sig Note     amitriptyline (ELAVIL) 10 MG tablet Take 1 tablet by mouth daily. 6/30/2016: Received from: External Pharmacy Received Sig: TK 1-2 TS PO QHS FOR SLEEP AND FIBROMYALGIA     cholecalciferol, vitamin D3, 50,000 unit capsule Twice weekly for 8 weeks.      ferrous gluconate (FERGON) 324 MG tablet Take 1 tablet (324 mg total) by mouth 2 (two) times a day with meals.      gabapentin (NEURONTIN) 600 MG tablet Take 1 tablet by mouth daily. 6/30/2016: Received from: External Pharmacy Received Sig: TK 1 T PO TID FOR PAIN     omeprazole (PRILOSEC) 20 MG capsule Take 1 capsule (20 mg total) by mouth 2 (two) times a day.      oxyCODONE-acetaminophen (PERCOCET) 7.5-325 mg per tablet Take 1 tablet by mouth as needed. 6/30/2016: Received from: External Pharmacy Received Sig: TK ONE T PO Q 6 H PRN     sucralfate (CARAFATE) 1 gram tablet Take 1 tablet (1 g total) by mouth 4 (four) times a day.      zinc gluconate 50 mg tablet Take 1 tablet (50 mg total) by mouth daily.        Past Surgical History  She has a past surgical history that includes Scar-en-y procedure (5/11/2004); REVISION OF PREVIOUS RNY GASTRIC BYPASS (4/14/2010); Tonsillectomy; and Esophagogastroduodenoscopy (N/A, 8/4/2015).     Examination   There were no vitals taken for this visit.  Height: 5' 3\" (1.6 m) (6/30/2016  8:54 AM)  Weight: 132 lb (59.9 kg) (6/30/2016  8:54 AM)  BMI (Calculated): 23.4 (6/30/2016  8:54 AM)  SpO2: 99 % (6/30/2016  8:54 AM)  General:  Alert and ambulatory,   HEENT:  No conjunctival pallor, moist " mucous Membranes, neck is supple  Pulmonary:  Normal respiratory effort, no cough, no audible wheezes/crackles.  CV:  Regular rate and Rhythm, no murmurs, pulses 2 plus  Abdominal: thin, non tender.   Extremities: no tremor. Low muscle mass.  Skin:  Warm/dry. Slightly pale complexion.  Pscyh/Mood: pleasant/cooperative. No distress.         Counseling:   We reviewed the important post op bariatric recommendations:  -eating 3 meals daily  -eating protein first, getting >60gm protein daily  -eating slowly, chewing food well  -avoiding/limiting calorie containing beverages  -drinking water 15-30 minutes before or after meals  -limiting restaurant or cafeteria eating to twice a week or less    We discussed the importance of restorative sleep and stress management in maintaining a healthy weight.  We discussed the National Weight Control Registry healthy weight maintenance strategies and ways to optimize metabolism.  We discussed the importance of physical activity including cardiovascular and strength training in maintaining a healthier weight.  We discussed the importance of life-long vitamin supplementation and life-long  follow-up.    Bart was reminded that, to avoid marginal ulcers she should avoid tobacco at all, alcohol in excess, caffeine in excess, and NSAIDS (unless indicated for cardioprotection or othewise and opposed by a PPI).    At least 25 minutes was spent in direct consultation and over 50% of the time devoted to counseling regarding maximizing the benefits of her previous bariatric surgery while minimizing risks of nutritional or structural complications. 5 minutes of smoking cessation counseling provided and Rx writtent for Barbise.    Conor Sam MD  [facility].  6/26/2017  9:45 AM

## 2021-06-11 NOTE — TELEPHONE ENCOUNTER
Refill Approved    Rx renewed per Medication Renewal Policy. Medication was last renewed on 5/6/20.    Angela Jolly, [facility] Triage/Med Refill 9/17/2020     Requested Prescriptions   Pending Prescriptions Disp Refills     gabapentin (NEURONTIN) 400 MG capsule 180 capsule 3     Sig: TK 2 CS PO 3 TO 4 TIMES PER DAY       Gabapentin/Levetiracetam/Tiagabine Refill Protocol  Passed - 9/15/2020  3:28 PM        Passed - PCP or prescribing provider visit in past 12 months or next 3 months     Last office visit with prescriber/PCP: 9/4/2020 Marie Bryan PA-C OR same dept: 9/4/2020 Marie Bryan PA-C OR same specialty: 9/4/2020 Marie Bryan PA-C  Last physical: 1/20/2020 Last MTM visit: Visit date not found   Next visit within 3 mo: Visit date not found  Next physical within 3 mo: Visit date not found  Prescriber OR PCP: Marie Bryan PA-C  Last diagnosis associated with med order: 1. Fibromyalgia  - gabapentin (NEURONTIN) 400 MG capsule; TK 2 CS PO 3 TO 4 TIMES PER DAY  Dispense: 180 capsule; Refill: 3    If protocol passes may refill for 12 months if within 3 months of last provider visit (or a total of 15 months).

## 2021-06-11 NOTE — TELEPHONE ENCOUNTER
LVMTCB of message to schedule appt below.    Summary of Your Rheumatology Visit     Next Appointment:  2-3 Months     Tests: Lab appts  Please have labs and x-rays that were ordered performed.    As discussed if experiencing resurfacing of rash on face can try again over-the-counter hydrocortisone 1% cream once or twice a day for up to 7 days.  If persists, recommend contacting a dermatologist.

## 2021-06-11 NOTE — PROGRESS NOTES
"  Mental Health Tele Visit Note    Patient: Bart Kraft    : 1965 MRN: [id number]    Date: 2020   Start time: 3:08pm   Stop Time: 3:25pm   Session # 4    The patient has been notified of the following:   \"We have found that certain health care needs can be provided without the need for a face to face visit.  This service lets us provide the care you need with a phone conversation.  I will have full access to your [facility] medical record during this entire phone call.   I will be taking notes for your medical record. Since this is like an office visit, we will bill your insurance company for this service.  There are potential benefits and risks of telephone visits (e.g. limits to patient confidentiality) that differ from in-person visits.?  Confidentiality still applies for telephone services, and nobody will record the visit.  It is important to be in a quiet, private space that is free of distractions (including cell phone or other devices) during the visit.?? If during the course of the call I believe a telephone visit is not appropriate, you will not be charged for this service\"  Consent has been obtained for this service by care team member: Yes, per verbal agreement     Session Type: Patient is participating in a telephone visit due to coronavirus pandemic. Patient and provider present for follow up session.    Chief Complaint   Patient presents with     MH Follow Up     coping with pain, fatigue and feeling overwhelmed and down at times       New symptoms or complaints: None    Functional Impairment:   Personal: 3  Family: 2  Work: 4  Social:4          ASSESSMENT: Current Emotional / Mental Status (status of significant symptoms):              Risk status (Self / Other harm or suicidal ideation)              Reviewed. MSE is current effective 2020.   Patient {denies personal safety concerns              Patient denies current or recent suicidal ideation or behaviors.              Patient " "denies current or recent homicidal ideation or behaviors.              Patient denies current or recent self injurious behavior or ideation.              Patient denies other safety concerns.              Patient denies changes in risk factors              Patient denies changes in risk factors              Recommended that patient call 911 or go to the local ED should there be a change in any of these risk factors.                Attitude:                                   Cooperative               Orientation:                             x4              Speech                          Rate / Production:       Normal                           Volume:                       Normal               Mood:                                      \"exhausted\" \"stressed              Thought Content:                    Clear               Thought Form:                        Coherent  Logical               Insight:                                     Good       Patient's impression of their current status:   Fatigue. Feel the need for a break and time for herself. Things have been overwhelming and stressful.     Therapist impression of patients current state: Patient presented for follow up individual psychotherapy visit via phone visit. She was alert, oriented and engaged. She requested a shorter session as she had forgotten about the appointment. Good awareness of how she is feeling physically and mentally. Improve focus on taking time for herself (self-care) efforts.  Continued stress and depressed mood as she has a busy family life and traci with chronic health conditions. She continues to benefit from supportive psychotherapy.     Type of psychotherapeutic technique provided: Insight oriented, Client centered and motivationl interviewing    Progress toward short term goals: Progress as expected, improved therapy attendance.  Open t recommendations from diagnostic assessment. Clearly articulated goals she would like to work " on through psychotherapy services.        Review of long term goals: Completed initial treatment plan today. Treatment Plan effective: 9/18/20-12/18/20.       Diagnosis:  1. Mild episode of recurrent major depressive disorder with anxious distress          Plan and Follow up: Patient is scheduled for follow up psychotherapy on 10/09/2020.  Prior to next session: implement time for self before getting to the point of burning out or complete exhaustion.    Discharge Criteria/Planning: Patient will continue with follow-up until therapy can be discontinued without return of signs and symptoms.      I have reviewed the note as documented above.  This accurately captures the substance of my conversation with the patient.  Cami Shaffer, SANG  9/18/20

## 2021-06-11 NOTE — TELEPHONE ENCOUNTER
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  Remainder of SANTIAGO related subsets were normal.    HLA-B27 antigen returned positive, this test is a genetic marker that can sometimes be associated with inflammatory spine pain.  Result needs to be correlated clinically.  We can discuss more extensively over follow-up visit.      B12 noted to be elevated, if taking supplements recommend cutting back and discuss further with PCP.    TSH (thyroid stimulating hormone) noted to be elevated which can be consistent with having low thyroid condition, recommend discussing further with PCP, please send copy of results to patient's PCP.    Minimally elevated CK muscle enzyme, current level not worrisome, recommend periodic monitoring.    Some abnormalities noted on CBC/cell count which are stable, recommend periodic monitoring.    Otherwise remainder of lab results were unremarkable.    Given positive SANTIAGO recommend obtaining urinalysis reflex culture (when not menstruating) and urine microalbumin level prior to next follow-up visit with us.         Xray results- hands    IMPRESSION:   No fracture or dislocation. Mild/moderate degenerative arthritis of the right STT (scaphotrapezotrapezoidal) joint of the thumb.. No other joint abnormality. No erosions.     Degenerative findings mentioned are consistent with having signs of Osteoarthritis. Osteoarthritis represents gradual wearing down with time of the protective cartilage in a joint.

## 2021-06-11 NOTE — PATIENT INSTRUCTIONS - HE
Summary of Your Rheumatology Visit    Next Appointment:  2-3 Months    Medications:    Please follow directives on pill bottle on how to take medication(s) provided.      Referrals:    Neurology    Tests:     Please have labs and x-rays that were ordered performed.        Injections:        Other:    As discussed if experiencing resurfacing of rash on face can try again over-the-counter hydrocortisone 1% cream once or twice a day for up to 7 days.  If persists, recommend contacting a dermatologist.

## 2021-06-12 NOTE — TELEPHONE ENCOUNTER
Pt notified of lab and xray results, pt will follow up with PCP on Thyroid labs. Pt states she drinks Five hour energy drinks that have vitamin b12, recommended trying to reduce consumption of those if possible. Pt will have labs prior to November appt.     Lab orders entered in chart.

## 2021-06-12 NOTE — PROGRESS NOTES
"  Mental Health Tele Visit Note    Patient: Bart Kraft    : 1965 MRN: [id number]    Date: 2020   Start time: 10:07am   Stop Time: 10:29am   Session # 5    The patient has been notified of the following:   \"We have found that certain health care needs can be provided without the need for a face to face visit.  This service lets us provide the care you need with a phone conversation.  I will have full access to your [facility] medical record during this entire phone call.   I will be taking notes for your medical record. Since this is like an office visit, we will bill your insurance company for this service.  There are potential benefits and risks of telephone visits (e.g. limits to patient confidentiality) that differ from in-person visits.?  Confidentiality still applies for telephone services, and nobody will record the visit.  It is important to be in a quiet, private space that is free of distractions (including cell phone or other devices) during the visit.?? If during the course of the call I believe a telephone visit is not appropriate, you will not be charged for this service\"  Consent has been obtained for this service by care team member: Yes, per verbal agreement     Session Type: Patient is participating in a telephone visit due to coronavirus pandemic. Patient and provider present for follow up session.    Chief Complaint   Patient presents with     MH Follow Up     coping with pain and fatigue, worries.        New symptoms or complaints: None    Functional Impairment:   Personal: 3  Family: 2  Work: 4  Social:4          ASSESSMENT: Current Emotional / Mental Status (status of significant symptoms):              Risk status (Self / Other harm or suicidal ideation)              Reviewed. MSE is current effective 20.   Patient {denies personal safety concerns              Patient denies current or recent suicidal ideation or behaviors.              Patient denies current or recent " "homicidal ideation or behaviors.              Patient denies current or recent self injurious behavior or ideation.              Patient denies other safety concerns.              Patient denies changes in risk factors              Patient denies changes in risk factors              Recommended that patient call 911 or go to the local ED should there be a change in any of these risk factors.                Attitude:                                   Cooperative               Orientation:                             x4              Speech                          Rate / Production:       Normal                           Volume:                       Normal               Mood:                                      Anxious  Depressed               Thought Content:                    Clear               Thought Form:                        Coherent  Logical               Insight:                                     Good       Patient's impression of their current status:   Lupus flair up- worn out. Financial Stress-  lost job.   Stress/Anxiety = \"High\". Notes tendency to get really stressed around the holidays.   Depression has been \"up and down.\"  Engaged in some self-care this past weekend.    Therapist impression of patients current state: Patient presented for follow up individual psychotherapy visit via phone visit. She was alert, oriented and engaged. She continues to have difficulty with household tasks and other daily functioning due to chronic health conditions such as Lupus. Continued efforts to cope with pain. Getting thyroid checked soon at clinic. She continues to benefit from supportive psychotherapy and work on accepting and coping with pain. She has good understanding of the relationship between her physical and mental health.     Type of psychotherapeutic technique provided: Insight oriented and Client centered, ACT.     Progress toward short term goals: Progress as expected, improved self-care " and listening to body.     Review of long term goals: Not completed today. Treatment Plan effective: 9/18/20-12/18/20.     Diagnosis:  1. 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 Tobacco use disorder        Plan and Follow up: Patient is scheduled for follow up psychotherapy on 11/16/20, 11/30/20.  Prior to next session: get thyroid checked by PCP. See If thyroid is contributing to any mental health symptoms.   Continue engaging in activities, but also listening to your body- Finding balance of coping with pain, but not avoiding activities which can cause worsening depression.    Discharge Criteria/Planning: Patient will continue with follow-up until therapy can be discontinued without return of signs and symptoms.      I have reviewed the note as documented above.  This accurately captures the substance of my conversation with the patient.  Cami Shaffer, SANG  11/2/2020

## 2021-06-12 NOTE — PROGRESS NOTES
Patient was scheduled for a virtual visit today for psychotherapy return. Patient did not join video visit after a link was sent. Patient did not answer the phone when provider tried to call. Provider left a . Provided phone number to call for reschedule if interested in seeing this provider in the future. No show for psychotherapy virtual visit today. Cami DOMÍNGUEZ

## 2021-06-12 NOTE — PROGRESS NOTES
Bart Kraft who presents today with a chief complaint of  No chief complaint on file.      Joint Pains: yes  Location: neck and shoulders, hands  Onset:chronic   Intensity:  6/10  AM Stiffness: 2-3 Hours  Alleviating/Aggravating Factors: doing more labor work cause more pain.  Tolerating Meds: yes  Other:       ROS:  Patient denies having any chest pain, shortness of breath, cough, abdominal pain, nausea, vomiting, rashes, fevers, oral ulcers and recent infections + (sinus infection off and on for the last month).  Patient admits to having a good appetite      Problem List:  Patient Active Problem List   Diagnosis     Vitamin D deficiency     Zinc deficiency     Tobacco dependence     Iron deficiency anemia     Postsurgical malabsorption     History of Scar-en-Y gastric bypass     Low bone mass     Lupus (H)     Other chronic pain     Fibromyalgia     Menopausal syndrome (hot flashes)     Mild intermittent asthma     Post-menopausal bleeding     Chronic pain syndrome     Subacute cutaneous lupus erythematosus     Mild episode of recurrent major depressive disorder with anxious distress     Tobacco use disorder     Right leg numbness     Right foot drop     Gastroesophageal reflux disease without esophagitis        PMH:   Past Medical History:   Diagnosis Date     Arthritis      Gastroesophageal reflux disease without esophagitis 9/7/2020     Intestinal obstruction (H) 5/20/2008     Low bone mass 7/19/2018     Mild episode of recurrent major depressive disorder with anxious distress 9/2/2020     Mild intermittent asthma 9/19/2019     Neuromuscular disorder (H)      Stomach ulcer      Subacute cutaneous lupus erythematosus        Surgical History:  Past Surgical History:   Procedure Laterality Date     CHOLECYSTECTOMY  05/10/2003     ESOPHAGOGASTRODUODENOSCOPY N/A 8/4/2015    Procedure: ESOPHAGOGASTRODUODENOSCOPY w/ biopsy by forceps;  Surgeon: Feliciano Grant MD;  Location: [facility];  Service:      REVISION  OF PREVIOUS RNY GASTRIC BYPASS  4/14/2010    w/total division of the stomach, redo of gastrojejunostomy and lysis of adhesions     ZULEMA-EN-Y PROCEDURE  5/11/2004    MD Francisca     TONSILLECTOMY       TUBAL LIGATION         Family History:  Family History   Problem Relation Age of Onset     Brain cancer Mother      No Medical Problems Father      No Medical Problems Brother      Breast cancer Maternal Aunt      Throat cancer Maternal Grandmother      Lung cancer Maternal Grandmother      Cancer Maternal Grandmother      Diabetes Paternal Grandmother      Rheum arthritis Paternal Grandmother      Breast cancer Paternal Grandmother      Cancer Paternal Grandmother      Kidney disease Paternal Grandmother      Heart attack Maternal Grandfather        Social History:   reports that she has been smoking cigarettes. She has a 52.50 pack-year smoking history. She has never used smokeless tobacco. She reports that she does not drink alcohol or use drugs.    Allergies:  Allergies   Allergen Reactions     Augmentin [Amoxicillin-Pot Clavulanate] Nausea And Vomiting     Penicillins Nausea And Vomiting        Current Medications:  Current Outpatient Medications   Medication Sig Dispense Refill     albuterol (PROAIR HFA;PROVENTIL HFA;VENTOLIN HFA) 90 mcg/actuation inhaler Inhale 2 puffs every 4 (four) hours as needed for wheezing. 1 Inhaler 1     cyclobenzaprine (FLEXERIL) 10 MG tablet Take 1 tab p.o. qhs (if feeling groggy the following morning, can try taking half a tablet instead or can take earlier the night before). 30 tablet 2     ergocalciferol (VITAMIN D2) 1,250 mcg (50,000 unit) capsule Take 1 capsule (50,000 Units total) by mouth 2 (two) times a week. 24 capsule 3     ferrous fumarate 325 mg (106 mg iron) Tab Take 325 mg by mouth daily. 90 tablet 3     gabapentin (NEURONTIN) 400 MG capsule TK 2 CS PO 3 TO 4 TIMES PER  capsule 11     hydrOXYzine pamoate (VISTARIL) 25 MG capsule TK 1 TO 2 CS PO UP TO QID FOR 4 DAYS  PRN       medroxyPROGESTERone (PROVERA) 10 MG tablet        omeprazole (PRILOSEC) 20 MG capsule Take 2 capsules (40 mg total) by mouth 2 (two) times a day before meals. 180 capsule 3     oxyCODONE (ROXICODONE) 10 mg immediate release tablet Take 10 mg by mouth every 6 (six) hours as needed.        Oxycodone HCl 20 mg Tab Take 1 tablet by mouth every 4 (four) hours as needed.  0     phenazopyridine HCl (AZO URINARY PAIN RELIEF ORAL) Take by mouth.       PNV,calcium 72-iron,carb-folic (PRENATAL PLUS) 29 mg iron- 1 mg Tab Take 1 tablet by mouth daily. 100 tablet 3     No current facility-administered medications for this visit.            Physical Exam:  Following up today via video visit, per Covid-19 pandemic requirements.    Verbal consent has been obtained for this service by care team member.    Video call start time: 10:37 AM, connected at 10:54 AM    Video call end time: 11:10 AM    Doximity utilized for video call.    Phone number utilized: [[phone]]    Patient location for video visit: Home     Provider location for video visit:  Home (working remotely)        Summary/Assessment:    History that includes cutaneous lupus and fibromyalgia.    Presents for follow-up visit.    States rashes have improved with over-the-counter hydrocortisone cream.    Still has occasional cramping sensations involving calves and sometimes hands.    Admits to using 5-hour energy drinks 2-3 times a day, perhaps contributing.  Patient also noted to have elevated TSH which may also be playing a role, plans on discussing further with PCP.    Flexeril prior to bedtime also beneficial.    Noted to have elevated B12, denies taking extraneous B12 or having excessive red meat in diet, plans on discussing further with PCP.    Positive HLA-B27 level, denies having low back pain.  C-spine x-rays did not show any signs of inflammatory arthritis.    States went to neurology for lower extremity paresthesias however, was unable to wait as had  another meeting to attend and provider was running > 1 hr behind, so left and plans on rescheduling.    Plans on having urinalysis performed that we ordered to complete SANTIAGO/lupus work-up.  SANTIAGO subsets were unremarkable.    Regarding left upper lobe lung nodule states has had this nodule for a few decades initially monitored.  Denies having any shortness of breath or cough.    Please see below for management plan.      From prior note: Patient presents today to become established with a rheumatologist.    Patient states that she was established with rheumatology in the past, last seen in 2013 by Dr. Tova Pham(note reviewed in our system).  Patient treated at that time for subcutaneous cutaneous lupus and arthritis.  Patient treated with Plaquenil and hydrocortisone cream for her face.    Patient states that her lupus mainly manifested with rashes involving face and chest.  Was rash free for a number of years (since 2013) up until December 2019 had a 2-week flare and another 2-week flare in February 2020.  Both of these episodes responded again to hydrocortisone cream.    Patient had gastric bypass surgery in 2004 which also benefited her cutaneous lupus.    States her skin lupus was confirmed by skin biopsy.  Denies having previous organ involvement.    Believes her cutaneous lupus first diagnosed in 1997.    Other symptoms associated with her lupus include photosensitivity, arthralgias and fatigue.  Adds notices that stress can sometimes exacerbate her symptoms.    Patient also diagnosed with fibromyalgia about 6 years ago.    Admits to having chronic nonrestorative sleep and to having component of anxiety/depression, sees a counselor.    Has chronic joint pains involving her neck, shoulders, elbows (left greater than right) and hands.      Denies having joint swelling at this time.    States her paternal grandmother had RA.    Was on Plaquenil for a number of years, discontinued about 6 to 7 years ago due to  stability.  When flaring in the past has also responded to prednisone.    For arthralgias takes Tylenol 3 tablets daily with some partial benefit.    Avoids traditional oral NSAIDs due to gastric bypass.  Already tried Celebrex in the past.    Patient also established with pain clinic, has some oxycodone which she plans on discontinuing in September 2020.    Has tried physical therapy and massage therapy in the past with partial benefit.    Patient is very active overseeing her grandchildren, has custody of 4 of her grandkids ranging in age between 3 and 10.    Also complains of having right distal lower extremity paresthesias that come and go over the past 6 months.  Patient is nondiabetic.  Denies regular alcohol consumption..    Denies trying other DMARDs.      No history of miscarriages.    For now patient's cutaneous lupus appears to be stable.    Regarding chronic arthralgias/myalgias, likely has component of fibromyalgia contributing.  May also have early degenerative joint disease playing a role.  We will correlate with labs and x-rays performed.    Pertinent rheumatology/past medical history (please refer to above for more detailed history):      Subcutaneous cutaneous lupus (face, chest, ? Discoid history per patient)    Photosensitivity    Fibromyalgia    Chronic arthralgias (elbows, hands, shoulders)    Chronic neck pain    History gastric bypass    Right distal lower extremity paresthesias    Recurrent sinus infections    Postmenopausal (> 2 yrs)    Iron deficiency anemia    Mild asthma    Osteopenia    Tobacco use/smoker    Positive family history for RA (grandmother)    Elevated B12    Elevated TSH      Rheumatology medications provided/suggested:    Flexeril  Hydrocortisone 1% as needed (OTC)      Pertinent medication from other providers or from otc (please refer to above for more detailed med list):    Tylenol  Gabapentin  Iron supplements  Vitamin D (50K qwk  regularly)  Elavil  Prilosec    Pertinent medications already tried:     Plaquenil  Prednisone  Chantix      Pertinent lab history:      Pertinent imaging/test history:    7/2018 bone density test consistent with having osteopenia with non-elevated frax score.    9/11/20 C Spine Xray   No fracture or subluxation. Straightening of the usual cervical lordosis. Moderate degenerative disc disease and spondylosis at C5-C6 and C6-C7. No erosions. No evidence of inflammatory spondyloarthropathy.     Incidental calcified granulomata and calcified lymph nodes in the left upper lobe and mediastinum.     Right hand x-rays, impression:  No fracture or dislocation. Mild/moderate degenerative arthritis of the right STT joint. No other joint abnormality. No erosions.      Other:    , has 4 children.  Used to work as a .  Currently has custody of 4 of her grandkids ranging in age from 3-10.    Denies regular alcohol consumption.    Positive tobacco use.    Plan:      Recommend cutting back on energy drinks and seeing if this helps her myalgias/cramping sensations.  Claims to be drinking sufficient amount of fluids daily.    Recommend discussing elevated TSH and elevated B12 levels further with PCP.  If has hypothyroidism can also potentially contribute to myalgias/muscle cramping.      If facial/chest rash resurfaces, can repeat OTC hydrocortisone 1% cream twice daily for 7 days.  We also discussed in the past if symptoms are recurrent a consideration is adding Plaquenil.      Continue Flexeril nightly as needed.    Continue Tylenol 1 to 2 tablets twice a day as needed.    Will avoid oral NSAIDs given history of gastric bypass.    For pain not responsive to Tylenol continue following up with pain clinic.    Continue utilizing sunscreen with SPF greater than 70% over sun exposed areas when outdoors.    Suggested following through/rescheduling with referral to neurology for distal right lower extremity  paresthesias.    Suggested pursuing urine lab orders.    Follow-up in 3 months.      Procedure note:     This note was transcribed using Dragon voice recognition software as a result unintentional grammatical errors or word substitutions may have occurred. Please contact our Rheumatology department if you need any clarification or if you have any related inquiries.    Major side effect profile of medications provided were discussed with the patient.      Roland Diaz DO    ...................  11/3/2020   10:14 AM

## 2021-06-13 NOTE — TELEPHONE ENCOUNTER
Called pt to relay Dr Diaz's lab results and pt wanted Thyroid results as well. I read Marie RODRIGUEZ lab comments to pt.   Pt states she does not take B12 pills but does drink 5 hour Energy drinks.     Pt will call back if GI pains continue to trouble her.

## 2021-06-13 NOTE — TELEPHONE ENCOUNTER
----- Message from Marie Bryan PA-C sent at 12/7/2020  8:25 PM CST -----  Her Vitamin B12 level is high.  Is she taking a B12 pill?  Her thyroid hormone and muscle test (CK) are back to normal.  One type of her white blood cells is a little high.  Sometimes this can be high if you have seasonal allergies or other conditions.  Less often it is high if you have an intestinal parasite.  If she is having any GI problems (pain, diarrhea, etc.) she could do O&P to investigate this.  Otherwise, I think it's OK to watch it for now.

## 2021-06-13 NOTE — PROGRESS NOTES
Bariatric Follow Up Visit with a History of Previous Bariatric Surgery     Date of visit: 9/22/2017  Physician: Gill Marcelino MD  Primary Care Provider:  MD Bart Pete   52 y.o.  female    Date of Surgery: 2004 RYGB with re-do 2010  Initial Weight: 276# was her highest weight  Initial BMI:   Today's Weight:   Wt Readings from Last 1 Encounters:   09/22/17 129 lb (58.5 kg)     Body mass index is 22.85 kg/(m^2).      Assessment and Plan     Assessment: Bart is a 52 y.o. year old female who is 13 yrs s/p  Scar en Y Gastric Bypass with Dr. Shonda Kraft feels as if she has achieved the goals she hoped to accomplish through bariatric surgery and weight loss.    Encounter Diagnoses   Name Primary?     History of Scar-en-Y gastric bypass      Anastomotic ulcer Yes     Tobacco dependence          Current Outpatient Prescriptions:      ergocalciferol (VITAMIN D2) 50,000 unit capsule, Take 1 capsule (50,000 Units total) by mouth 2 (two) times a week., Disp: 24 capsule, Rfl: 2     gabapentin (NEURONTIN) 800 MG tablet, Take 1 tablet by mouth daily., Disp: , Rfl:      MULTIVITAMIN WITH IRON ORAL, Take 1 tablet by mouth daily., Disp: , Rfl:      omeprazole (PRILOSEC) 20 MG capsule, Take 1 capsule (20 mg total) by mouth 2 (two) times a day., Disp: 180 capsule, Rfl: 3     oxyCODONE-acetaminophen (PERCOCET) 7.5-325 mg per tablet, Take 1 tablet by mouth as needed., Disp: , Rfl: 0     prenatal 34-iron-folic-dss-dha 29 mg iron-1 mg -50 mg-265 mg cap, Take 1 capsule by mouth 2 (two) times a day., Disp: 120 capsule, Rfl: 6     sucralfate (CARAFATE) 1 gram tablet, Take 1 tablet (1 g total) by mouth 4 (four) times a day., Disp: 360 tablet, Rfl: prn    Plan: Call it quits referral. Refill omeprazole to alternate pharmacy (she had refills good until 5/2018 with carafate with refills.) She will continue the PPI BID as long as she is smoking and using the excess caffeine in the energy drinks. She can d/c  "carafate  I encouraged her to d/c the energy drinks and take SL B-12. She will not consider this at this time. She has decreased from 4 5-hour energy drinks daily to 2. Her hemoglobin has normalized after starting the PNV plus BID. Was 8.8 in June, now in ER recently 12.4. DEXA.      Return in about 1 year (around 9/22/2018), or sooner if problems or concerns.    Bariatric Surgery Review     Interim History/LifeChanges: was in the ER. History ulcers. May EGD showed ulcer and she was put on PPI BID and carafate. She ran out of meds and and presented to ER recently where EDG showed gastritis and ulcer. She needs refills. She continues to smoke 1 ppd tobacco, she drinks 2 5-hour energy drinks daily. She does not use alcohol and she does not currently use NSAIDS as these gave her an ulcer in the past. She is under a lot of stress currently with family members who are not well.     Patient Concerns: f/u ER visit. Needs refills of PPI and carafate    Medication changes: omeprazole and carafate    Vitamin Intake:   B-12   energy drink 5 hour energy 2/d   MVI  2 PNV plus   Vitamin D  50,000 2X/wk   Calcium   no     Other                LABS: \"Reviewed    Nausea yes  Vomiting not since restarting her meds  Constipation no  Diarrhea no  Rashes no  Hair Loss no  Calf tenderness no  Breathing difficulty no  Reactive Hypoglycemia yes  Light Headedness no   Moods depression, grieving    12 point ROS as above and otherwise negative      Habits:  Alcohol: 1/mo  Tobacco: 1ppd  Caffeine 2 energy drink  NSAIDS none now  Exercise Routine: she and her  walk in the evening and she is on the go  3 meals/day sometimes  Protein first yes  50-60grams/day  Water Separate from meals B: eggs, WW L:salad with eggs or meat sandwich, D: beef  Calorie Containing Beverages no  Restaurant eating/wk rarely  Sleeping 4-6-has amitriptyline for FM and could use that  Stress high now  CPAP: NA  Contraception: TL    Social History     Social History " "    Social History     Marital status:      Spouse name: N/A     Number of children: N/A     Years of education: N/A     Occupational History     Not on file.     Social History Main Topics     Smoking status: Current Every Day Smoker     Smokeless tobacco: Not on file     Alcohol use No     Drug use: No     Sexual activity: Not on file     Other Topics Concern     Not on file     Social History Narrative       Past Medical History     Past Medical History:   Diagnosis Date     Lupus      Problem List     Patient Active Problem List   Diagnosis     Vitamin D deficiency     Zinc deficiency     Tobacco dependence     Low serum vitamin D     Iron deficiency anemia     Postsurgical malabsorption     History of Scar-en-Y gastric bypass     Medications     Current Outpatient Prescriptions   Medication Sig Note     ergocalciferol (VITAMIN D2) 50,000 unit capsule Take 1 capsule (50,000 Units total) by mouth 2 (two) times a week.      gabapentin (NEURONTIN) 800 MG tablet Take 1 tablet by mouth daily. 9/22/2017: Received from: External Pharmacy     MULTIVITAMIN WITH IRON ORAL Take 1 tablet by mouth daily.      omeprazole (PRILOSEC) 20 MG capsule Take 1 capsule (20 mg total) by mouth 2 (two) times a day.      oxyCODONE-acetaminophen (PERCOCET) 7.5-325 mg per tablet Take 1 tablet by mouth as needed. 6/30/2016: Received from: External Pharmacy Received Sig: TK ONE T PO Q 6 H PRN     prenatal 34-iron-folic-dss-dha 29 mg iron-1 mg -50 mg-265 mg cap Take 1 capsule by mouth 2 (two) times a day.      sucralfate (CARAFATE) 1 gram tablet Take 1 tablet (1 g total) by mouth 4 (four) times a day.      Surgical History     Past Surgical History  She has a past surgical history that includes Scar-en-y procedure (5/11/2004); REVISION OF PREVIOUS RNY GASTRIC BYPASS (4/14/2010); Tonsillectomy; Esophagogastroduodenoscopy (N/A, 8/4/2015); and Tubal ligation.    Objective-Exam     Constitutional:  /70  Pulse 68  Resp 18  Ht 5' 3\" " "(1.6 m)  Wt 129 lb (58.5 kg)  SpO2 100%  BMI 22.85 kg/m2  Height: 5' 3\" (1.6 m) (9/22/2017 11:12 AM)  Initial Weight: 276 lbs (6/26/2017  9:46 AM)  Weight: 129 lb (58.5 kg) (9/22/2017 11:12 AM)  Weight loss from initial: 148 (6/26/2017  9:46 AM)  % Weight loss: 53.62 % (6/26/2017  9:46 AM)  BMI (Calculated): 22.9 (9/22/2017 11:12 AM)  SpO2: 100 % (9/22/2017 11:12 AM)  General:  Thin. Comfortable, pleasant. Accompanied by her 27 yo daughter, tobacco odor, hoarse voice  Eyes:  EOMI  ENT:  Airway 2+  Moist mucous membranes, tongue discolored, edentulous on the top, missing multiple teeth on bottom, discolored/poor dentitian  Neck:  Supple, No LAD, No thyromegaly, No carotid bruits appreciated  Respiratory: Normal respiratory effort, clear to shallow respiration  CV:  Regular rate and Rhythm,no murmurs, pulses 2+, no calf tenderness, no LE edema  Gastrointestinal: Abdomen NT/ND, BS+  Musculoskeletal: muscle mass OK lower extremities  Skin: color tan hair full,   Neurological: No tremor, normal gait  Psychiatric: alert and oriented X3, mood and affect normal    Counseling     We reviewed the important post op bariatric recommendations:  -eating 3 meals daily  -eating protein first, getting >60gm protein daily  -eating slowly, chewing food well  -avoiding/limiting calorie containing beverages  -drinking water 15-30 minutes before or after meals  -choosing wheat, not white with breads, crackers, pastas, jazmine, bagels, tortillas, rice  -limiting restaurant or cafeteria eating to twice a week or less    We discussed the importance of restorative sleep and stress management in maintaining a healthy weight.  We discussed the National Weight Control Registry healthy weight maintenance strategies and ways to optimize metabolism.  We discussed the importance of physical activity including cardiovascular and strength training in maintaining a healthier weight.    We discussed the importance of life-long vitamin supplementation " and life-long  follow-up.    Bart was reminded that, to avoid marginal ulcers she should avoid tobacco at all, alcohol in excess, caffeine in excess, and NSAIDS (unless indicated for cardioprotection or othewise and opposed by a PPI).    Gill Marcelino MD, [facility].  9/22/2017  12:01 PM     30 minutes spent with patient. >50% in counseling and coordination of care.

## 2021-06-13 NOTE — PROGRESS NOTES
Subjective:    Bart Kraft is a 55 y.o. female who presents with chief complaint of follow-up abnormal labs.  Patient recently saw rheumatology for follow-up on her fibromyalgia, lupus, and other associated conditions/pain.  Provider did some lab work and there were some mild abnormalities.  Thyroid hormone was mildly elevated, CK total was mildly elevated, B12 elevated, minimally elevated eosinophils.  She was asked to follow-up with me for repeat testing.  I reviewed lab results for all of the tests mentioned above done on 9/11/2020.  She also had some urine tests that provider wanted done.  She will get those done today as well.  She is feeling significantly fatigued and tired.  No cold intolerance.  She had mention today that she needed to take an energy shot drink at 7 AM.  Her rheumatologist had also discussed trying to wean off of these.  She says she is so fatigued that she is unable to do this completely, though she is trying.  Continues to smoke 1 pack a day.    She is also having problems with sinus congestion.  She took her partner's allergy pills for a few days last week and those helped minimally.  She also used Flonase this morning and that helped minimally.  Overall she has had nasal congestion for about 2 weeks.  Congestion is staying the same.  She is blowing a lot of snot out of her nose.  No fevers.  Some mild sinus facial pain.  She has had sinus infections in the past.      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 qhs (if feeling groggy the following morning, can try taking half a tablet instead or can take earlier the night before)., Disp: 30 tablet, Rfl: 2     ergocalciferol (VITAMIN D2) 1,250 mcg (50,000 unit) capsule, Take 1 capsule (50,000 Units total) by mouth 2 (two) times a week., Disp: 24 capsule, Rfl: 3     gabapentin (NEURONTIN) 400 MG capsule, TK 2 CS PO 3 TO 4 TIMES PER DAY, Disp: 360 capsule, Rfl: 11     medroxyPROGESTERone (PROVERA) 10 MG tablet, , Disp: , Rfl:      omeprazole (PRILOSEC) 20 MG capsule, Take 2 capsules (40 mg total) by mouth 2 (two) times a day before meals., Disp: 180 capsule, Rfl: 3     PNV,calcium 72-iron,carb-folic (PRENATAL PLUS) 29 mg iron- 1 mg Tab, Take 1 tablet by mouth daily., Disp: 100 tablet, Rfl: 3     azithromycin (ZITHROMAX Z-PRAVEEN) 250 MG tablet, Take 2 tablets (500 mg) on  Day 1,  Then take 1 tablet (250 mg) once daily on Days 2 through 5., Disp: 6 tablet, Rfl: 0     ferrous fumarate 325 mg (106 mg iron) Tab, Take 325 mg by mouth daily., Disp: 90 tablet, Rfl: 3     hydrOXYzine pamoate (VISTARIL) 25 MG capsule, TK 1 TO 2 CS PO UP TO QID FOR 4 DAYS PRN, Disp: , Rfl:      oxyCODONE (ROXICODONE) 10 mg immediate release tablet, Take 10 mg by mouth every 6 (six) hours as needed. , Disp: , Rfl:      Oxycodone HCl 20 mg Tab, Take 1 tablet by mouth every 4 (four) hours as needed., Disp: , Rfl: 0     phenazopyridine HCl (AZO URINARY PAIN RELIEF ORAL), Take by mouth., Disp: , Rfl:      Objective:   Allergies:  Augmentin [amoxicillin-pot clavulanate] and Penicillins    Vitals:  Vitals:    11/16/20 1142   BP: 120/74    Patient Site: Left Arm   Patient Position: Sitting   Cuff Size: Adult Regular   Pulse: 68   Weight: 124 lb (56.2 kg)     Body mass index is 21.97 kg/m .    Vital signs reviewed.  General: Patient is alert and oriented x 3, in no apparent distress  Ears: TMs are non-erythematous with good light reflex bilaterally  Face: Mild pain with percussion of frontal and maxillary sinuses bilaterally  Lymphatic: no anterior cervical lymph node enlargement  Cardiac: regular rate and rhythm, no murmurs  Pulmonary: lungs clear to auscultation bilaterally, no crackles, rales, rhonchi, or wheezing noted    Results for orders placed or performed in visit on 11/16/20   HM1 (CBC with Diff)   Result Value Ref Range    WBC 8.3 4.0 - 11.0 thou/uL    RBC 4.63 3.80 - 5.40 mill/uL    Hemoglobin 13.3 12.0 - 16.0 g/dL    Hematocrit 41.4 35.0 - 47.0 %    MCV 89 80 - 100 fL    MCH 28.8 27.0 - 34.0 pg    MCHC 32.2 32.0 - 36.0 g/dL    RDW 14.7 (H) 11.0 - 14.5 %    Platelets 291 140 - 440 thou/uL    MPV 7.8 7.0 - 10.0 fL    Neutrophils % 55 50 - 70 %    Lymphocytes % 25 20 - 40 %    Monocytes % 10 2 - 10 %    Eosinophils % 9 (H) 0 - 6 %    Basophils % 1 0 - 2 %    Neutrophils Absolute 4.5 2.0 - 7.7 thou/uL    Lymphocytes Absolute 2.1 0.8 - 4.4 thou/uL    Monocytes Absolute 0.8 0.0 - 0.9 thou/uL    Eosinophils Absolute 0.8 (H) 0.0 - 0.4 thou/uL    Basophils Absolute 0.1 0.0 - 0.2 thou/uL   Urinalysis-UC if Indicated   Result Value Ref Range    Color, UA Yellow Colorless, Yellow, Straw, Light Yellow    Clarity, UA Clear Clear    Glucose, UA Negative Negative    Bilirubin, UA Negative Negative    Ketones, UA Negative Negative    Specific Gravity, UA 1.015 1.005 - 1.030    Blood, UA Negative Negative    pH, UA 7.0 5.0 - 8.0    Protein, UA Negative Negative mg/dL    Urobilinogen, UA 0.2 E.U./dL 0.2 E.U./dL, 1.0 E.U./dL    Nitrite, UA Negative Negative    Leukocytes, UA Negative Negative   Other labs pending.    Assessment and Plan:   1. Acute  non-recurrent sinusitis, unspecified location  Sinus symptoms for 2 weeks not improving, she is a smoker.  She has allergies to penicillin and Augmentin.  Prescription sent today for azithromycin x5 days.  She can continue to use symptomatic treatment as well.  - azithromycin (ZITHROMAX Z-PRAVEEN) 250 MG tablet; Take 2 tablets (500 mg) on  Day 1,  Then take 1 tablet (250 mg) once daily on Days 2 through 5.  Dispense: 6 tablet; Refill: 0    2. Elevated TSH  Mildly elevated in September.  I will follow-up with current lab results and treat as appropriate.  - Thyroid Cascade    3. Elevated CK  Mildly elevated in September.  I will follow-up with current lab results.  If abnormal, probably would defer to rheumatology for treatment discussion.  - CK Total    4. Elevated vitamin B12 level  Elevated in September.  I will follow-up with pending labs.  - Vitamin B12    5. Eosinophilic leukocytosis, unspecified type  Eosinophilia seems to have worsened slightly since September.  Could possibly be allergy symptoms.  I will review this with patient.  We could consider O&P x3 if she would like.  Could also wait and discuss this with rheumatology as well.  - HM1(CBC and Differential)    6. Fibromyalgia  7. Positive SANTIAGO (antinuclear antibody)  Ordered by rheumatology.  That provider will follow up with results.  - Microalbumin, Random Urine  - Urinalysis-UC if Indicated    This dictation uses voice recognition software, which may contain typographical errors.

## 2021-06-13 NOTE — TELEPHONE ENCOUNTER
----- Message from Roland Diaz DO sent at 11/29/2020  1:32 PM CST -----  Noted to have minimally elevated microalbumin in urine. A urine microalbumin test is a test to detect very small levels of a blood protein (albumin) in your urine. Recommend rechecking level in 4-6 weeks.    Normal urinalysis    Please place lab orders mentioned above.
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
## 2021-06-14 NOTE — PROGRESS NOTES
Bart Kraft who presents today with a chief complaint of  No chief complaint on file.      Joint Pains: Yes  Location: neck and upper back  Onset: last weekend  Intensity:  8/10  AM Stiffness: 2-3 hours  Alleviating/Aggravating Factors: walking helps  Tolerating Meds: Yes  Other: pt reports having legs pain at night that pt has to walk around to relief the pain.      ROS:  Patient denies having: persistent dry eyes, dry mouth, recurrent oral ulcers, patchy alopecia, active rashes, photosensitivity, history of psoriasis, active chest pain, active shortness of breath, active cough, active dysuria, history of kidney stones, active abdominal pain, active diarrhea, history of hematochezia, active dysphagia, history of peptic ulcer disease, history of HIV, tuberculosis, hepatitis B or C, Lyme disease, seizure history, raynaud's, active documented fevers, recent infections, difficulty sleeping + (lately) or chronic unrefreshing sleep, involuntary weight loss, loss of appetite, excessive fatigue, depression, anxiety,  recurrent sinus infections + (2-3 Dec. 2020), history of inflammatory eye diseases (such as uveitis, scleritis, iritis, etc).     Information gathered by medical assistant incorporated into this note, was reviewed and discussed with the patient.    Problem List:  Patient Active Problem List   Diagnosis     Vitamin D deficiency     Zinc deficiency     Iron deficiency anemia     Postsurgical malabsorption     History of Scar-en-Y gastric bypass     Low bone mass     Lupus (H)     Fibromyalgia     Menopausal syndrome (hot flashes)     Mild intermittent asthma     Post-menopausal bleeding     Chronic pain syndrome     Subacute cutaneous lupus erythematosus     Mild episode of recurrent major depressive disorder with anxious distress     Tobacco use disorder     Right leg numbness     Right foot drop     Gastroesophageal reflux disease without esophagitis        PMH:   Past Medical History:   Diagnosis Date      Arthritis      Gastroesophageal reflux disease without esophagitis 9/7/2020     Intestinal obstruction (H) 5/20/2008     Low bone mass 7/19/2018     Mild episode of recurrent major depressive disorder with anxious distress 9/2/2020     Mild intermittent asthma 9/19/2019     Neuromuscular disorder (H)      Stomach ulcer      Subacute cutaneous lupus erythematosus        Surgical History:  Past Surgical History:   Procedure Laterality Date     CHOLECYSTECTOMY  05/10/2003     ESOPHAGOGASTRODUODENOSCOPY N/A 8/4/2015    Procedure: ESOPHAGOGASTRODUODENOSCOPY w/ biopsy by forceps;  Surgeon: Feliciano Grant MD;  Location: [facility];  Service:      REVISION OF PREVIOUS RNY GASTRIC BYPASS  4/14/2010    w/total division of the stomach, redo of gastrojejunostomy and lysis of adhesions     ZULEMA-EN-Y PROCEDURE  5/11/2004    MD Francisca     TONSILLECTOMY       TUBAL LIGATION         Family History:  Family History   Problem Relation Age of Onset     Brain cancer Mother      No Medical Problems Father      No Medical Problems Brother      Breast cancer Maternal Aunt      Throat cancer Maternal Grandmother      Lung cancer Maternal Grandmother      Cancer Maternal Grandmother      Diabetes Paternal Grandmother      Rheum arthritis Paternal Grandmother      Breast cancer Paternal Grandmother      Cancer Paternal Grandmother      Kidney disease Paternal Grandmother      Heart attack Maternal Grandfather        Social History:   reports that she has been smoking cigarettes. She has a 52.50 pack-year smoking history. She has never used smokeless tobacco. She reports that she does not drink alcohol or use drugs.    Allergies:  Allergies   Allergen Reactions     Augmentin [Amoxicillin-Pot Clavulanate] Nausea And Vomiting     Penicillins Nausea And Vomiting        Current Medications:  Current Outpatient Medications   Medication Sig Dispense Refill     albuterol (PROAIR HFA;PROVENTIL HFA;VENTOLIN HFA) 90 mcg/actuation inhaler  Inhale 2 puffs every 4 (four) hours as needed for wheezing. 1 Inhaler 1     cyclobenzaprine (FLEXERIL) 10 MG tablet Take 1 tab p.o. qhs (if feeling groggy the following morning, can try taking half a tablet instead or can take earlier the night before). 30 tablet 2     ergocalciferol (VITAMIN D2) 1,250 mcg (50,000 unit) capsule Take 1 capsule (50,000 Units total) by mouth 2 (two) times a week. 24 capsule 3     ferrous fumarate 325 mg (106 mg iron) Tab Take 325 mg by mouth daily. 90 tablet 3     gabapentin (NEURONTIN) 400 MG capsule TK 2 CS PO 3 TO 4 TIMES PER  capsule 11     hydrOXYzine pamoate (VISTARIL) 25 MG capsule TK 1 TO 2 CS PO UP TO QID FOR 4 DAYS PRN       medroxyPROGESTERone (PROVERA) 10 MG tablet        omeprazole (PRILOSEC) 20 MG capsule Take 2 capsules (40 mg total) by mouth 2 (two) times a day before meals. 180 capsule 3     oxyCODONE (ROXICODONE) 10 mg immediate release tablet Take 10 mg by mouth every 6 (six) hours as needed.        Oxycodone HCl 20 mg Tab Take 1 tablet by mouth every 4 (four) hours as needed.  0     phenazopyridine HCl (AZO URINARY PAIN RELIEF ORAL) Take by mouth.       PNV,calcium 72-iron,carb-folic (PRENATAL PLUS) 29 mg iron- 1 mg Tab Take 1 tablet by mouth daily. 100 tablet 3     No current facility-administered medications for this visit.            Physical Exam:  Following up today via video visit, per Covid-19 pandemic requirements.    Verbal consent has been obtained for this service by care team member.    Video call start time: 11:49 AM    Video call end time: 12:11 PM    Doximity utilized for video call.    Phone number utilized: [[phone]]    Patient location for video visit: Home     Provider location for video visit:  Home (working remotely)      Summary/Assessment:    History that includes cutaneous lupus, HLA- B27 and fibromyalgia.    Presents for follow-up visit.    States has been experiencing some neck and upper back pains.  Also had some restless leg  pains.    Has not been taking her Flexeril.  Restarted taking a few days ago and has noticed some improvement.  However still bothered by residual neck/upper back pains.    States rashes have improved/been stable with over-the-counter hydrocortisone cream, taking as needed.    Has cut back on 5-hour energy drinks now just taking once a day, was taking 2-3 times a day.  Believes this was contributing to elevated B12 levels (noticed include B12).    Apparently repeat TSH normalized, states not taking any thyroid medication at this time.    States saw her PCP yesterday.    Positive HLA-B27 level, denies having low back pain.  C-spine x-rays did not show any signs of inflammatory arthritis.    Did not f/u with Neurology referral. In past stated went to Neurology for lower extremity paresthesias however, was unable to wait as had another meeting to attend and provider was running > 1 hr behind, so left and plans on rescheduling.     SANTIAGO subsets were unremarkable.    Urinalysis was unremarkable.  Very mildly elevated urine microalbumin level.      States no longer seeing the pain clinic, was on oxycodone in the past.    Please see below for management plan.      From prior note:     Regarding left upper lobe lung nodule states has had this nodule for a few decades initially monitored.  Denies having any shortness of breath or cough.    Patient presents today to become established with a rheumatologist.    Patient states that she was established with rheumatology in the past, last seen in 2013 by Dr. Tova Pham(note reviewed in our system).  Patient treated at that time for subcutaneous cutaneous lupus and arthritis.  Patient treated with Plaquenil and hydrocortisone cream for her face.    Patient states that her lupus mainly manifested with rashes involving face and chest.  Was rash free for a number of years (since 2013) up until December 2019 had a 2-week flare and another 2-week flare in February 2020.  Both of  these episodes responded again to hydrocortisone cream.    Patient had gastric bypass surgery in 2004 which also benefited her cutaneous lupus.    States her skin lupus was confirmed by skin biopsy.  Denies having previous organ involvement.    Believes her cutaneous lupus first diagnosed in 1997.    Other symptoms associated with her lupus include photosensitivity, arthralgias and fatigue.  Adds notices that stress can sometimes exacerbate her symptoms.    Patient also diagnosed with fibromyalgia about 6 years ago.    Admits to having chronic nonrestorative sleep and to having component of anxiety/depression, sees a counselor.    Has chronic joint pains involving her neck, shoulders, elbows (left greater than right) and hands.      Denies having joint swelling at this time.    States her paternal grandmother had RA.    Was on Plaquenil for a number of years, discontinued about 6 to 7 years ago due to stability.  When flaring in the past has also responded to prednisone.    For arthralgias takes Tylenol 3 tablets daily with some partial benefit.    Avoids traditional oral NSAIDs due to gastric bypass.  Already tried Celebrex in the past.    Patient also established with pain clinic, has some oxycodone which she plans on discontinuing in September 2020.    Has tried physical therapy and massage therapy in the past with partial benefit.    Patient is very active overseeing her grandchildren, has custody of 4 of her grandkids ranging in age between 3 and 10.    Also complains of having right distal lower extremity paresthesias that come and go over the past 6 months.  Patient is nondiabetic.  Denies regular alcohol consumption..    Denies trying other DMARDs.      No history of miscarriages.    For now patient's cutaneous lupus appears to be stable.    Regarding chronic arthralgias/myalgias, likely has component of fibromyalgia contributing.  May also have early degenerative joint disease playing a role.  We will  correlate with labs and x-rays performed.    Pertinent rheumatology/past medical history (please refer to above for more detailed history):      Subcutaneous cutaneous lupus (face, chest, ? Discoid history per patient)    HLA-B27 positive (no signs of spondyloarthropathy on C-spine x-ray)    Photosensitivity    Fibromyalgia    Chronic arthralgias (elbows, hands, shoulders)    Chronic neck pain    History gastric bypass    Right distal lower extremity paresthesias    Recurrent sinus infections    Postmenopausal (> 2 yrs)    Iron deficiency anemia    Mild asthma    Osteopenia    Tobacco use/smoker    Positive family history for RA (grandmother)    Elevated B12 (energy drink related, per patient)    Elevated TSH (repeat normalized without meds)      Rheumatology medications provided/suggested:    Flexeril  Hydrocortisone 1% as needed (OTC)      Pertinent medication from other providers or from otc (please refer to above for more detailed med list):    Tylenol  Gabapentin  Iron supplements  Vitamin D (50K qwk regularly)  Elavil  Prilosec    Pertinent medications already tried:     Plaquenil (discontinued over time due to stability)  Prednisone  Chantix      Pertinent lab history:      Pertinent imaging/test history:    7/2018 bone density test consistent with having osteopenia with non-elevated frax score.    9/11/20 C Spine Xray   No fracture or subluxation. Straightening of the usual cervical lordosis. Moderate degenerative disc disease and spondylosis at C5-C6 and C6-C7. No erosions. No evidence of inflammatory spondyloarthropathy.     Incidental calcified granulomata and calcified lymph nodes in the left upper lobe and mediastinum.     Right hand x-rays, impression:  No fracture or dislocation. Mild/moderate degenerative arthritis of the right STT joint. No other joint abnormality. No erosions.      Other:      Other:    Marital status:       How many kids:  4     Type of work:  unemployed, used to work as a  [employer]. Currently has custody of 4 of her grandkids ranging in age from 3-10.    Drinking alcohol: no    Tobacco use: yes, a pack a day    Recreational drug use: no     History hysterectomy: no     Tubal ligation: yes     Partner vasectomy: no       Plan:      Will place referral to spine clinic for chronic neck/upper back pains.    Continue limiting energy drinks given history of myalgias/cramping sensations and elevated B12 levels.  Claims to be drinking sufficient amount of fluids daily.     If facial/chest rash resurfaces, can repeat OTC hydrocortisone 1% cream twice daily for 7 days.  We also discussed in the past if symptoms are recurrent a consideration is adding Plaquenil.      Recommend that she try taking Flexeril on a regular basis prior to bedtime and see if this benefits her neck/upper back pains and occasional restless leg symptoms.    Continue Tylenol 1 to 2 tablets twice a day as needed.    Will avoid oral NSAIDs given history of gastric bypass.    Currently not seeing pain clinic (was on oxycodone in the past)    Continue utilizing sunscreen with SPF greater than 70% over sun exposed areas when outdoors.    Again suggested following through/rescheduling with referral to neurology for distal right lower extremity paresthesias.    Recommend having labs performed.    Follow-up in 4 months.    Total time spent 41 minutes involved with patient care, includes placing orders, reviewing records and and formulating management plan.    This note was transcribed using Dragon voice recognition software as a result unintentional grammatical errors or word substitutions may have occurred. Please contact our Rheumatology department if you need any clarification or if you have any related inquiries.    Thank you for referring this patient to our clinic.      Roland Diaz DO   ....................  2/3/2021   9:00 AM

## 2021-06-14 NOTE — PROGRESS NOTES
Outpatient Mental Health Treatment Plan    Name:  Bart Kraft  :  1965  MRN:  [id number]    Treatment Plan:  Updated Treatment Plan  Date Treatment Plan Initiated:  2020  Benefit and risks and alternatives have been discussed: Yes    Plan:       ? Depression    Goal:  Decrease average depression level from 3 to 1.   Strategies:    ?[x] Decrease social isolation     [x] Increase involvement in meaningful activities     ?[x] Discuss sleep hygiene     ?[x] Explore thoughts and expectations about self and others     ?[x] Process grief (loss of significant person, independence, role,        etc.)     ?[x] Assess for suicide risk     ?[x] Implement physical activity routine (with physician approval)     [x] Consider introduction of bibliotherapy and/or videos     [x] Continue compliance with medical treatment plan (or explore         barriers)       ?    Degree to which this is a problem (1-4): 3  Degree to which goal is met (1-4)1  Date of Review: 2021-2021          ?   ? Anxiety    Goal:  Decrease average anxiety level from 3 to 1.   Strategies: ? [x]Learn and practice relaxation techniques and other coping        strategies (e.g., thought stopping, reframing, meditation)     ? [x] Increase involvement in meaningful activities     ? [x] Discuss sleep hygiene     ? [x] Explore thoughts and expectations about self and others     ? [x] Identify and monitor triggers for panic/anxiety symptoms     ? [x] Implement physical activity routine (with physician approval)     ? [x] Consider introduction of bibliotherapy and/or videos     ? [x] Continue compliance with medical treatment plan (or explore          barriers)                                           Degree to which this is a problem (1-4): 3  Degree to which goal is met (1-4)1  Date of Review: 2021-2021      Chronic pain/Fatigue  Goal:  ? Decrease average pain level from 7/10 to 3/10.   ? Increase % acceptance of pain from 70 to  80.   Strategies: ? [x] Explore thoughts and expectations about self and others    [x] Explore emotional reactions to illness/injury      ? [x] Learn and practice relaxation techniques and other coping          strategies            [x] Implement physical activity routine (with physician approval)     ? [x] Engage in values clarification and goal-setting     ? [x] Consider introduction of bibliotherapy and/or videos     ? [x] Increase involvement in meaningful activities     ? [x] Discuss sleep hygiene     ? [x] Process grief (loss of significant person, independence, role,          etc.)     ? [x] Assess for suicide risk     ?  Degree to which this is a problem (1-4): 4  Degree to which goal is met (1-4)1  Date of Review: 1/21/2021-4/21/2021      Functional Impairment: 1=Not at all/Rarely  2=Some days  3=Most Days  4=Every Day   Personal: 3  Family: 2  Work: 4  Social:4      Diagnosis:  1. Mild episode of recurrent major depressive disorder with anxious distress     Tobacco Use Disorder (F17.200)  R/O: 300.02 (F41.1) Generalized Anxiety Disorder due to symptoms of anxiety.     Strengths:  commitment to health and well being, family support and insight.   Limitations:  financial hardship and physical chester concerns.   Cultural Considerations: , English speaking female. Accepting of Western medicine and has established primary care at the [facility]. She is open to mental health therapy- it is not a new concept for her.  Contextual influences on patient's health include: Family Factors - stress with children and having custody of some of her grandchildren and Health- Seeking Factors pain concerns (fibromyalgia, arthritis) and chronic health conditions including Lupus that impact her daily life and her mood.   Family involvement - No- Patient did not request family involvement at this time.       Anticipated intensity of services:  Every 2 weeks    Estimated duration of treatment:  20 sessions  Necessity  for frequency: This frequency is needed to establish therapeutic goals and for continuity of care in order to monitor progress.   Necessity for treatment: To address cognitive, behavioral, and/or emotional barriers in order to work toward goals and to improve quality of life.   Is this treatment appropriate with minimal intrusion/restrictions: yes    Persons responsible for this plan:  ? [x] Patient ? [x] Provider ? [x] Other: ___coordinate care with PCP as appropriate_______________      Provider: SANG Lagunas  Date:  1/21/2021

## 2021-06-14 NOTE — PATIENT INSTRUCTIONS - HE
Summary of Your Rheumatology Visit    Next Appointment:   4 Months    Medications:    Recommend taking Flexeril prior to bedtime on a more regular basis and see if this provides greater benefit.    Referrals:     Spine clinic    Tests:     Please have labs that were ordered performed.    Recommend having labs performed at a [facility] facility, as results are then automatically uploaded into our in-basket system.  If having labs performed at a [facility] facility then patient should contact/notify us soon thereafter, so we can actively retrieve results from [facility] system.      Injections:        Other:

## 2021-06-14 NOTE — PROGRESS NOTES
"Mental Health Tele Visit Note    Patient: Bart Kraft    : 1965 MRN: [id number]    Date: 2021   Start time: 9:24am   Stop Time: 9:57am   Session # 1    The patient has been notified of the following:   \"We have found that certain health care needs can be provided without the need for a face to face visit.  This service lets us provide the care you need with a phone conversation.  I will have full access to your [facility] medical record during this entire phone call.   I will be taking notes for your medical record. Since this is like an office visit, we will bill your insurance company for this service.  There are potential benefits and risks of telephone visits (e.g. limits to patient confidentiality) that differ from in-person visits.?  Confidentiality still applies for telephone services, and nobody will record the visit.  It is important to be in a quiet, private space that is free of distractions (including cell phone or other devices) during the visit.?? If during the course of the call I believe a telephone visit is not appropriate, you will not be charged for this service\"  Consent has been obtained for this service by care team member: Yes, per verbal agreement     Session Type: Patient is participating in a telephone visit due to coronavirus pandemic. Patient and provider present for follow up session.    Chief Complaint   Patient presents with      Follow Up     coping with fatigue, pain, and depressed mood.        New symptoms or complaints: None    Functional Impairment:   Personal: 3  Family: 2  Work: 4  Social:4          ASSESSMENT: Current Emotional / Mental Status (status of significant symptoms):              Risk status (Self / Other harm or suicidal ideation)              Reviewed. MSE is current effective 2021  C-SSRS  Suicidal Ideation  1. Wish to be Dead (Since Last Visit): No  2. Non-Specific Active Suicidal Thoughts (Since Last Visit): No  3. Active Suicidal Ideation with " any Methods (Not Plan) Without Intent to Act (Since Last Visit): No  4. Active Suicidal Ideation with Some Intent to Act, Without Specific Plan (Since Last Visit): No  5. Active Suicidal Ideation with Specific Plan and Intent (Since Last Visit): No  Suicidal Behavior  Actual Attempt (Since Last Visit): No  Has subject engaged in non-suicidal self-injurious behavior? (Since Last Visit): No  Interrupted Attempts (Since Last Visit): No  Aborted or Self-Interrupted Attempt (Since Last Visit): No  Preparatory Acts or Behavior (Since Last Visit): No     Patient denies personal safety concerns              Patient denies current or recent suicidal ideation or behaviors.              Patient denies current or recent homicidal ideation or behaviors.              Patient denies current or recent self injurious behavior or ideation.              Patient denies other safety concerns.               Patient denies changes in risk factors              Patient denies changes in risk factors              Recommended that patient call 911 or go to the local ED should there be a change in any of these risk factors.                Attitude:                                   Cooperative               Orientation:                             x4              Speech                          Rate / Production:       Normal                           Volume:                       Normal               Mood:                                      Anxious  Depressed               Thought Content:                    Clear               Thought Form:                        Coherent  Logical               Insight:                                     Good       Patient's impression of their current status:   So stressed over the holidays.   The holidays are always bad and depressing.  Last week, couldn't move or do too much. Stayed in bed and would sleep 6 hours in the day and 10 hrs at night.   Fatigued today, but getting out of bed more this week.  "  Interpersonal stress with extended family    Therapist impression of patients current state: Patient presented for follow up individual psychotherapy visit via phone visit. She presents with depressed mood. She sounds tired today. She continues to also cope with physical pain concerns from chronic conditions.  A lot of the stress around the holidays is self imposed. Financial stress impacted the holidays as well as her hopes to move this spring.   She was receptive to going over communication skills to help address the family stress and plans to communicate about it this weekend.    Patient and therapist updated treatment plan goals and measures. View  Treatment plan document and flow-sheets.  PHQ-9 Total Score: 12  MICHELLE 7 Total Score: 8  Score: __/4: 0  Clinical Global Impressions  Considering your total clinical experience with this particular patient population, how severe are the patient's symptoms at this time?: 4 - Moderately ill  Compared to the patient's condition at the START of treatment, this patient's condition is:: 3 - Minimally improved      Type of psychotherapeutic technique provided: Insight oriented and Client centered, CBT, relational- communication    Progress toward short term goals: Progress as expected, has been working on cognitive reframing skills outside of session.    Depression has been more severe since our last session- impacted by the holidays.     Review of long term goals: Reviewed and updated today. Treatment Plan effective: 1/21/2021-4/21/2021.     Diagnosis:  1. 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 Tobacco use disorder        Plan and Follow up: Patient is scheduled for follow up psychotherapy on 2/8/2021  Prior to next session, utilize effective communication skills we role played today- using \"I feel statements.\" Jot down thoughts or feelings to process through them before the conversation you plan to have.      Discharge " Criteria/Planning: Patient will continue with follow-up until therapy can be discontinued without return of signs and symptoms.      I have reviewed the note as documented above.  This accurately captures the substance of my conversation with the patient.  Cami Shaffer, JENNSW  1/21/2021
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
## 2021-06-15 NOTE — PROGRESS NOTES
"Mental Health Tele Visit Note    Patient: Bart Kraft    : 1965 MRN: [id number]    Date: 3/8/2021   Start time: 10:10am   Stop Time: 10:57am   Session # 3    The patient has been notified of the following:   \"We have found that certain health care needs can be provided without the need for a face to face visit.  This service lets us provide the care you need with a phone conversation.  I will have full access to your [place] medical record during this entire phone call.   I will be taking notes for your medical record. Since this is like an office visit, we will bill your insurance company for this service.  There are potential benefits and risks of telephone visits (e.g. limits to patient confidentiality) that differ from in-person visits.?  Confidentiality still applies for telephone services, and nobody will record the visit.  It is important to be in a quiet, private space that is free of distractions (including cell phone or other devices) during the visit.?? If during the course of the call I believe a telephone visit is not appropriate, you will not be charged for this service\"  Consent has been obtained for this service by care team member: Yes, per verbal agreement     Session Type: Patient is participating in a telephone visit due to coronavirus pandemic. Patient and provider present for follow up session.    Chief Complaint   Patient presents with     MH Follow Up     coping with pain and depressed mood.       New symptoms or complaints: None    Functional Impairment:   Personal: 3  Family: 2  Work: 4  Social:4          ASSESSMENT: Current Emotional / Mental Status (status of significant symptoms):              Risk status (Self / Other harm or suicidal ideation)    Reviewed, effective 3/8/2021   Patient denies personal safety concerns              Patient denies current or recent suicidal ideation or behaviors.              Patient denies current or recent homicidal ideation or behaviors.        " "      Patient denies current or recent self injurious behavior or ideation.              Patient denies other safety concerns.               Patient denies changes in risk factors              Patient denies changes in risk factors              Recommended that patient call 911 or go to the local ED should there be a change in any of these risk factors.                Attitude:                                   Cooperative               Orientation:                             x4              Speech                          Rate / Production:       Normal                           Volume:                       Normal               Mood:                                      Depressed , anxious              Thought Content:                    Rumination               Thought Form:                        Coherent  Logical               Insight:                                     Good       Patient's impression of their current status:   Restless legs and now shoulder/arm concerns in the night.   Pain has been down a bit since weather has been warmer. Rates as 5 or 6/10.   \"I've been a little more depressed.\"  \"Doing a lot of worrying again\"- this comes up when I don't have a lot of control. Family related.  Expressed therapy has been really helpful.      Therapist impression of patients current state: Patient presented for follow up individual psychotherapy visit via phone visit. She was a no show for our last session. Patient is alert, oriented and engaged in session. She presents with depressed mood, anxious thoughts and chronic pain. Family health concerns causing worry. Patient has aging parents and aunts/uncles that are starting to need more care causing worry and stress. 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 Recommend ensuring self care as well- tuning into needs " "and ensuring she isn't draining herself. She continues to benefit from psychotherapy to be able to process her thoughts and feelings.       Type of psychotherapeutic technique provided: Insight oriented and Client centered, CBT, mindfulness    Progress toward short term goals: Progress as expected, continued efforts to cope with pain such as relaxation and imagery tools. Pain has decreased and been more tolerable.     Review of long term goals: Not today. Treatment Plan effective: 1/21/2021-4/21/2021.     Diagnosis:  1. 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 Tobacco use disorder        Plan and Follow up: Patient is scheduled for follow up psychotherapy on 3/22/2021  Prior to next session, continue to work redirecting thoughts to the present moment. Look into bishnu recommendation \"Letting Go\" related to co-dependence.  Plan to work on more body based relaxation techniques to help cope with pain.     Discharge Criteria/Planning: Patient will continue with follow-up until therapy can be discontinued without return of signs and symptoms.      I have reviewed the note as documented above.  This accurately captures the substance of my conversation with the patient.  Cami Shaffer, SANG  3/08/2021  "

## 2021-06-15 NOTE — PROGRESS NOTES
Patient was scheduled for a virtual visit today for psychotherapy return. Patient did not answer the phone when provider tried to call. Provider left a VM. Reminded patient of any upcoming appointments. No show for psychotherapy virtual visit today. Cami DOMÍNGUEZ

## 2021-06-15 NOTE — PROGRESS NOTES
"  Mental Health Tele Visit Note    Patient: Bart Kraft    : 1965 MRN: [id number]    Date: 2021   Start time: 10:38am   Stop Time: 11:04am   Session # 2    The patient has been notified of the following:   \"We have found that certain health care needs can be provided without the need for a face to face visit.  This service lets us provide the care you need with a phone conversation.  I will have full access to your [place] medical record during this entire phone call.   I will be taking notes for your medical record. Since this is like an office visit, we will bill your insurance company for this service.  There are potential benefits and risks of telephone visits (e.g. limits to patient confidentiality) that differ from in-person visits.?  Confidentiality still applies for telephone services, and nobody will record the visit.  It is important to be in a quiet, private space that is free of distractions (including cell phone or other devices) during the visit.?? If during the course of the call I believe a telephone visit is not appropriate, you will not be charged for this service\"  Consent has been obtained for this service by care team member: Yes, per verbal agreement     Session Type: Patient is participating in a telephone visit due to coronavirus pandemic. Patient and provider present for follow up session.    Chief Complaint   Patient presents with     MH Follow Up     coping with pain, stress and depressed mood.        New symptoms or complaints: None    Functional Impairment:   Personal: 3  Family: 2  Work: 4  Social:4          ASSESSMENT: Current Emotional / Mental Status (status of significant symptoms):              Risk status (Self / Other harm or suicidal ideation)  Reviewed, effective 2021   Patient denies personal safety concerns              Patient denies current or recent suicidal ideation or behaviors.              Patient denies current or recent homicidal ideation or " "behaviors.              Patient denies current or recent self injurious behavior or ideation.              Patient denies other safety concerns.               Patient denies changes in risk factors              Patient denies changes in risk factors              Recommended that patient call 911 or go to the local ED should there be a change in any of these risk factors.                Attitude:                                   Cooperative               Orientation:                             x4              Speech                          Rate / Production:       Normal                           Volume:                       Normal               Mood:                                      Depressed               Thought Content:                    Clear               Thought Form:                        Coherent  Logical               Insight:                                     Good       Patient's impression of their current status:   Foggy brain- difficult to concentrate (pain related)  Significant pain- days in bed. Colder weather doesn't help.   Sleep more when in severe pain.   Depression is \"getting a little better.\"   Stress levels are up and down.     Therapist impression of patients current state: Patient presented for follow up individual psychotherapy visit via phone visit. Session started late- she missed the first telephone call attempt. We were able to connect for session at 2nd phone outreach. She presents with fatigue, chronic pain, and depressed mood. Activities of daily living are difficult due to Lupus and other chronic pain concerns. She would benefit from supportive services. Chronic pain also impacts mental health- worsening depression when pain is severe. Unable to work due to these chronic health/pain conditions.   Pt had recent PCP appt for annual physical.     Type of psychotherapeutic technique provided: Insight oriented and Client centered, CBT    Progress toward short term goals: " Progress as expected, utilizing communication skills. Processing thoughts. Continued efforts to cope with chronic pain. Depression has decreased since last session.    Review of long term goals: Not today. Treatment Plan effective: 1/21/2021-4/21/2021.     Diagnosis:  1. 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 Tobacco use disorder        Plan and Follow up: Patient is scheduled for follow up psychotherapy on 2/23/2021  Prior to next session, patient to engage in calming imagery practice to help cope with pain.   Plan to work on more body based relaxation techniques to help cope with pain.     Discharge Criteria/Planning: Patient will continue with follow-up until therapy can be discontinued without return of signs and symptoms.      I have reviewed the note as documented above.  This accurately captures the substance of my conversation with the patient.  SANG Lagunas  2/8/2021

## 2021-06-15 NOTE — TELEPHONE ENCOUNTER
Patient is wanting a business card for Dr Diaz and clinic information mailed to her, if possible. She is applying for [employer] and she needs to give that information to them.

## 2021-06-15 NOTE — TELEPHONE ENCOUNTER
Pt unable to talk with me. Please give pt a call back to schedule her appt. If able, please mail a rheumatology business card with Dr. Diaz name on it to pt home address. If not able to perform the latter, please route to rheumatology support. Thank you A TON!        Recommend having labs performed.    Follow-up in 4 months.

## 2021-06-16 PROBLEM — M79.7 FIBROMYALGIA: Status: ACTIVE | Noted: 2019-09-19

## 2021-06-16 PROBLEM — M21.371 RIGHT FOOT DROP: Status: ACTIVE | Noted: 2020-09-07

## 2021-06-16 PROBLEM — J45.20 MILD INTERMITTENT ASTHMA: Status: ACTIVE | Noted: 2019-09-19

## 2021-06-16 PROBLEM — K21.9 GASTROESOPHAGEAL REFLUX DISEASE WITHOUT ESOPHAGITIS: Status: ACTIVE | Noted: 2020-09-07

## 2021-06-16 PROBLEM — R20.0 RIGHT LEG NUMBNESS: Status: ACTIVE | Noted: 2020-09-07

## 2021-06-16 PROBLEM — G89.4 CHRONIC PAIN SYNDROME: Status: ACTIVE | Noted: 2020-02-10

## 2021-06-16 PROBLEM — F33.0 MILD EPISODE OF RECURRENT MAJOR DEPRESSIVE DISORDER (H): Status: ACTIVE | Noted: 2020-09-02

## 2021-06-16 PROBLEM — F17.200 TOBACCO USE DISORDER: Status: ACTIVE | Noted: 2020-09-02

## 2021-06-16 PROBLEM — N95.0 POST-MENOPAUSAL BLEEDING: Status: ACTIVE | Noted: 2020-01-25

## 2021-06-16 PROBLEM — M85.80 LOW BONE MASS: Status: ACTIVE | Noted: 2018-07-19

## 2021-06-16 PROBLEM — N95.1 MENOPAUSAL SYNDROME (HOT FLASHES): Status: ACTIVE | Noted: 2019-09-19

## 2021-06-16 NOTE — TELEPHONE ENCOUNTER
Refill Approved    Rx renewed per Medication Renewal Policy. Medication was last renewed on 1/20/20.    Shorty Webb, [facility] Triage/Med Refill 3/26/2021     Requested Prescriptions   Pending Prescriptions Disp Refills     ferrous fumarate 325 mg (106 mg iron) Tab 90 tablet 3     Sig: Take 325 mg by mouth daily.       Iron Supplements Refill Protocol Passed - 3/25/2021 12:03 PM        Passed - PCP or prescribing provider visit in past 12 months       Last office visit with prescriber/PCP: 11/16/2020 Marie Bryan PA-C OR same dept: 11/16/2020 Marie Bryan PA-C OR same specialty: 11/16/2020 Marie Bryan PA-C  Last physical: 2/1/2021 Last MTM visit: Visit date not found   Next visit within 3 mo: Visit date not found  Next physical within 3 mo: Visit date not found  Prescriber OR PCP: Marie Bryan PA-C  Last diagnosis associated with med order: 1. History of Scar-en-Y gastric bypass  - ferrous fumarate 325 mg (106 mg iron) Tab; Take 325 mg by mouth daily.  Dispense: 90 tablet; Refill: 3    2. Postsurgical malabsorption  - ferrous fumarate 325 mg (106 mg iron) Tab; Take 325 mg by mouth daily.  Dispense: 90 tablet; Refill: 3    3. Iron deficiency anemia, unspecified iron deficiency anemia type  - ferrous fumarate 325 mg (106 mg iron) Tab; Take 325 mg by mouth daily.  Dispense: 90 tablet; Refill: 3    If protocol passes may refill for 12 months if within 3 months of last provider visit (or a total of 15 months).             Passed - Hemoglobin or Hematocrit in last 12 months     Hemoglobin   Date Value Ref Range Status   11/16/2020 13.3 12.0 - 16.0 g/dL Final     Hematocrit   Date Value Ref Range Status   11/16/2020 41.4 35.0 - 47.0 % Final

## 2021-06-16 NOTE — TELEPHONE ENCOUNTER
Medication Request  Medication name: Ferretts 325 mg tablets  Requested [facility]acy: [facility]  Reason for request: refill needed  When did you use medication last?:  01/20/2020  Patient offered appointment:  N/A - electronic request

## 2021-06-16 NOTE — TELEPHONE ENCOUNTER
Refill Approved    Rx renewed per Medication Renewal Policy. Medication was last renewed on 9/4/20.    Shorty Webb, [facility] Triage/Med Refill 4/19/2021     Requested Prescriptions   Pending Prescriptions Disp Refills     omeprazole (PRILOSEC) 20 MG capsule [Pharmacy Med Name: OMEPRAZOLE 20MG CAPSULES] 180 capsule 3     Sig: TAKE 2 CAPSULES(40 MG) BY MOUTH TWICE DAILY BEFORE MEALS       GI Medications Refill Protocol Passed - 4/18/2021  6:30 AM        Passed - PCP or prescribing provider visit in last 12 or next 3 months.     Last office visit with prescriber/PCP: 11/16/2020 Marie Bryan PA-C OR same dept: 11/16/2020 Marie Bryan PA-C OR same specialty: 11/16/2020 Marie Bryan PA-C  Last physical: 2/1/2021 Last MTM visit: Visit date not found   Next visit within 3 mo: Visit date not found  Next physical within 3 mo: Visit date not found  Prescriber OR PCP: Marie Bryan PA-C  Last diagnosis associated with med order: 1. History of Scar-en-Y gastric bypass  - omeprazole (PRILOSEC) 20 MG capsule [Pharmacy Med Name: OMEPRAZOLE 20MG CAPSULES]; TAKE 2 CAPSULES(40 MG) BY MOUTH TWICE DAILY BEFORE MEALS  Dispense: 180 capsule; Refill: 3    2. Postsurgical malabsorption  - omeprazole (PRILOSEC) 20 MG capsule [Pharmacy Med Name: OMEPRAZOLE 20MG CAPSULES]; TAKE 2 CAPSULES(40 MG) BY MOUTH TWICE DAILY BEFORE MEALS  Dispense: 180 capsule; Refill: 3    3. Gastroesophageal reflux disease without esophagitis  - omeprazole (PRILOSEC) 20 MG capsule [Pharmacy Med Name: OMEPRAZOLE 20MG CAPSULES]; TAKE 2 CAPSULES(40 MG) BY MOUTH TWICE DAILY BEFORE MEALS  Dispense: 180 capsule; Refill: 3    4. Tobacco abuse  - omeprazole (PRILOSEC) 20 MG capsule [Pharmacy Med Name: OMEPRAZOLE 20MG CAPSULES]; TAKE 2 CAPSULES(40 MG) BY MOUTH TWICE DAILY BEFORE MEALS  Dispense: 180 capsule; Refill: 3    If protocol passes may refill for 12 months if within 3 months of last provider visit (or a total of 15  months).

## 2021-06-17 NOTE — TELEPHONE ENCOUNTER
RN cannot approve Refill Request    RN can NOT refill this medication med is not covered by policy/route to provider. Last office visit: 5/6/2020 David Kowalski MD Last Physical: Visit date not found Last MTM visit: Visit date not found Last visit same specialty: 11/16/2020 Marie Bryan PA-C.  Next visit within 3 mo: Visit date not found  Next physical within 3 mo: Visit date not found      Heather Yanez, [facility] Triage/Med Refill 5/13/2021    Requested Prescriptions   Pending Prescriptions Disp Refills     ergocalciferol (ERGOCALCIFEROL) 1,250 mcg (50,000 unit) capsule [Pharmacy Med Name: VITAMIN D2 50,000IU (ERGO) CAP RX] 24 capsule 3     Sig: TAKE 1 CAPSULE BY MOUTH 2 TIMES A WEEK       There is no refill protocol information for this order

## 2021-06-18 NOTE — PROGRESS NOTES
Bariatric Follow Up Visit with a History of Previous Bariatric Surgery     Date of visit: 6/26/2018  Physician: Gill Marcelino MD  Primary Care Provider:  MD Bart Pete   52 y.o.  female    Date of Surgery: 5/11/2004  Initial Weight: 276#  Initial BMI:   Today's Weight:   Wt Readings from Last 1 Encounters:   06/26/18 122 lb (55.3 kg)     Body mass index is 21.61 kg/(m^2).      Assessment and Plan     Assessment: Bart is a 52 y.o. year old female who is 14 yrs s/p  Scar en Y Gastric Bypass with Dr. Shonda Kraft feels as if she has achieved the goals she hoped to accomplish through bariatric surgery and weight loss.  She had a stressful year but has done OK with her vitamins with 3 months being the longest time off of them. She tried chantix but was very irritable.    Encounter Diagnoses   Name Primary?     Tobacco dependence Yes     History of Scar-en-Y gastric bypass          Current Outpatient Prescriptions:      ergocalciferol (VITAMIN D2) 50,000 unit capsule, Take 1 capsule (50,000 Units total) by mouth 2 (two) times a week., Disp: 24 capsule, Rfl: 2     gabapentin (NEURONTIN) 800 MG tablet, Take 1 tablet by mouth daily., Disp: , Rfl:      medroxyPROGESTERone (PROVERA) 10 MG tablet, Take 1 tablet by mouth see administration instructions. 1 DAILY FOR 10 DAYS EACH MONTH, Disp: , Rfl: 3     omeprazole (PRILOSEC) 20 MG capsule, Take 1 capsule (20 mg total) by mouth 2 (two) times a day., Disp: 180 capsule, Rfl: 3     Oxycodone HCl 20 mg Tab, Take 1 tablet by mouth every 4 (four) hours as needed., Disp: , Rfl: 0     PREPLUS 27 mg iron- 1 mg Tab, Take 1 tablet by mouth 2 (two) times a day., Disp: , Rfl: 4     sucralfate (CARAFATE) 1 gram tablet, Take 1 tablet (1 g total) by mouth 4 (four) times a day., Disp: 360 tablet, Rfl: prn     prenatal 34-iron-folic-dss-dha 29 mg iron-1 mg -50 mg-265 mg cap, Take 1 capsule by mouth 2 (two) times a day., Disp: 120 capsule, Rfl: 6    Plan: She is ready  to quit smoking and would like to get wellbutrin from Dr. Shaffer. She has had contact from the [facility] team in the past and declines another initiation of contact by them.  We reviewed the available labs. She will continue to take her vitamins as consistently as possible. She should continue her PPI two times a d/t tobacco use and stress.   I encouraged her to continue to wean down and off of her energy drinks and to take a B-12 supplement as she does that. Reminded her of the importance of restorative sleep which has been difficult with the 11 mo old twins in the house. DEXA order in the chart. Reminded her of the importance of this. She was given the number to call to schedule this.    Return in about 1 year (around 6/26/2019).    Bariatric Surgery Review     Interim History/LifeChanges: See above. Also, weight was down to 112#. Still using PPI two times a day and carafate 4 daily    Patient Concerns: labs  Appetite (1-10): up and down  GERD: no    Medication changes:     Vitamin Intake:   B-12   energy drinks ave 2 daily   MVI  PNV 1-2/d   Vitamin D  RX   Calcium        Other                LABS: partially reviewed  PTH normal, Hgb Normal. Lipids, CMP and CBC look good. A1c normal.  Nausea burning/nausea with stress and tobacco use  Vomiting occ  Dry heaves  Constipation no  Diarrhea no  Rashes no  Hair Loss coming back  Calf tenderness no  Breathing difficulty no  Reactive Hypoglycemia yes  Light Headedness no   Moods OK    12 point ROS as above and otherwise negative      Habits:  Alcohol: none  Tobacco: 1ppd  Caffeine energy drinks 2/d  NSAIDS none  Exercise Routine: Pool therapy and PT 2X/wk  3 meals/day yes  Protein first yes  60 grams/day  Water Separate from meals yes  Calorie Containing Beverages no  Restaurant eating/wk 0-1  Sleeping well-4-6 hours watching the twins  Stress improving  CPAP: na  Contraception: PM    Social History     Social History     Social History     Marital status:       Spouse name: N/A     Number of children: N/A     Years of education: N/A     Occupational History     Not on file.     Social History Main Topics     Smoking status: Current Every Day Smoker     Smokeless tobacco: Never Used     Alcohol use No     Drug use: No     Sexual activity: Not on file     Other Topics Concern     Not on file     Social History Narrative       Past Medical History     Past Medical History:   Diagnosis Date     Lupus      Problem List     Patient Active Problem List   Diagnosis     Vitamin D deficiency     Zinc deficiency     Tobacco dependence     Low serum vitamin D     Iron deficiency anemia     Postsurgical malabsorption     History of Scar-en-Y gastric bypass     Medications     Current Outpatient Prescriptions   Medication Sig Note     ergocalciferol (VITAMIN D2) 50,000 unit capsule Take 1 capsule (50,000 Units total) by mouth 2 (two) times a week.      gabapentin (NEURONTIN) 800 MG tablet Take 1 tablet by mouth daily. 9/22/2017: Received from: External Pharmacy     medroxyPROGESTERone (PROVERA) 10 MG tablet Take 1 tablet by mouth see administration instructions. 1 DAILY FOR 10 DAYS EACH MONTH 6/26/2018: Received from: External Pharmacy Received Sig: TK 1 T PO ONCE D FOR 10 DAYS OF EACH MONTH     omeprazole (PRILOSEC) 20 MG capsule Take 1 capsule (20 mg total) by mouth 2 (two) times a day.      Oxycodone HCl 20 mg Tab Take 1 tablet by mouth every 4 (four) hours as needed. 6/26/2018: Received from: External Pharmacy Received Sig: TK 1 T PO Q 4 TO 6 H PRF MODERATE TO SEVERE P     PREPLUS 27 mg iron- 1 mg Tab Take 1 tablet by mouth 2 (two) times a day. 6/26/2018: Received from: External Pharmacy Received Sig: TK 1 T PO BID     sucralfate (CARAFATE) 1 gram tablet Take 1 tablet (1 g total) by mouth 4 (four) times a day.      prenatal 34-iron-folic-dss-dha 29 mg iron-1 mg -50 mg-265 mg cap Take 1 capsule by mouth 2 (two) times a day.      Surgical History     Past Surgical History  She has a  "past surgical history that includes Scar-en-y procedure (5/11/2004); REVISION OF PREVIOUS RNY GASTRIC BYPASS (4/14/2010); Tonsillectomy; Esophagogastroduodenoscopy (N/A, 8/4/2015); and Tubal ligation.    Objective-Exam     Constitutional:  /81  Pulse 67  Resp 16  Ht 5' 3\" (1.6 m)  Wt 122 lb (55.3 kg)  BMI 21.61 kg/m2  Height: 5' 3\" (1.6 m) (6/26/2018  9:01 AM)  Initial Weight: 276 lbs (6/26/2018  9:01 AM)  Weight: 122 lb (55.3 kg) (6/26/2018  9:01 AM)  Weight loss from initial: 154 (6/26/2018  9:01 AM)  % Weight loss: 55.8 % (6/26/2018  9:01 AM)  BMI (Calculated): 21.6 (6/26/2018  9:01 AM)  SpO2: 100 % (9/22/2017 11:12 AM)  NSAIDS: No (6/26/2018  9:01 AM)  Pain Scale: 7 (6/26/2018  9:01 AM)  General:  Pleasant and in no acute distress, thin  Eyes:  EOMI  ENT:  Airway 1+  Moist mucous membranes  Neck:  Supple, No LAD, No thyromegaly, No carotid bruits appreciated  Respiratory: Normal respiratory effort, no cough, wheezes or crackles  CV:  Regular rate and Rhythm,no murmurs, pulses 2+, no calf tenderness, no LE edema  Gastrointestinal: Abdomen NT/ND, BS+  Musculoskeletal: muscle mass WNL  Skin: color pale hair thin, incisions nicely healed  Neurological: No tremor, normal gait  Psychiatric: alert and oriented X3, mood and affect normal    Counseling     We reviewed the important post op bariatric recommendations:  -eating 3 meals daily  -eating protein first, getting >60gm protein daily  -eating slowly, chewing food well  -avoiding/limiting calorie containing beverages  -drinking water 15-30 minutes before or after meals  -choosing wheat, not white with breads, crackers, pastas, jazmine, bagels, tortillas, rice  -limiting restaurant or cafeteria eating to twice a week or less    We discussed the importance of restorative sleep and stress management in maintaining a healthy weight.  We discussed the National Weight Control Registry healthy weight maintenance strategies and ways to optimize metabolism.  We " discussed the importance of physical activity including cardiovascular and strength training in maintaining a healthier weight.    We discussed the importance of life-long vitamin supplementation and life-long  follow-up.    Bart was reminded that, to avoid marginal ulcers she should avoid tobacco at all, alcohol in excess, caffeine in excess, and NSAIDS (unless indicated for cardioprotection or othewise and opposed by a PPI).    Gill Marcelino MD, [facility].  6/26/2018  9:36 AM      No images are attached to the encounter.   30 minutes spent with patient. >50% in counseling and coordination of care.

## 2021-06-18 NOTE — PROGRESS NOTES
Here for 14 yrs s/p RNY f/u visit.  Pt did her labs this morning at [facility].  See flowsheet.    Azul Blackburn RN, CBN  [facility]  P [phone]  F [phone]

## 2021-06-18 NOTE — PROGRESS NOTES
14 yrs post op lab orders placed for patient in preparation for appointment with  on 6/26/18.  Called pt and left her a message reminding her about having the labs drawn at .    Azul Blackburn RN, N  [facility]  P [phone]  F [phone]

## 2021-06-20 NOTE — LETTER
Letter by Marie Bryan PA-C at      Author: Marie Bryan PA-C Service: -- Author Type: --    Filed:  Encounter Date: 1/28/2020 Status: (Other)         Bart Kraft  [address] [place] [address]       January 28, 2020     Dear Ms. Kraft,    Below are the results from your recent visit:    Resulted Orders   Lipid Cascade   Result Value Ref Range    Cholesterol 159 <=199 mg/dL    Triglycerides 121 <=149 mg/dL    HDL Cholesterol 47 (L) >=50 mg/dL    LDL Calculated 88 <=129 mg/dL    Patient Fasting > 8hrs? Yes    Glucose   Result Value Ref Range    Glucose 93 70 - 99 mg/dL    Patient Fasting > 8hrs? Yes     Narrative    Fasting Glucose reference range is 70-99 mg/dL per  American Diabetes Association (ADA) guidelines.   Wet Prep, Vaginal   Result Value Ref Range    Yeast Result No yeast seen No yeast seen    Trichomonas No Trichomonas seen No Trichomonas seen    Clue Cells, Wet Prep No Clue cells seen No Clue cells seen   Chlamydia trachomatis & Neisseria gonorrhoeae, Amplified Detection   Result Value Ref Range    Chlamydia trachomatis, Amplified Detection Negative Negative    Neisseria gonorrhoeae, Amplified Detection Negative Negative   Vitamin D, Total (25-Hydroxy)   Result Value Ref Range    Vitamin D, Total (25-Hydroxy) 24.9 (L) 30.0 - 80.0 ng/mL    Narrative    Deficiency <10.0 ng/mL  Insufficiency 10.0-29.9 ng/mL  Sufficiency 30.0-80.0 ng/mL  Toxicity (possible) >100.0 ng/mL   HM1 (CBC with Diff)   Result Value Ref Range    WBC 9.5 4.0 - 11.0 thou/uL    RBC 4.31 3.80 - 5.40 mill/uL    Hemoglobin 12.2 12.0 - 16.0 g/dL    Hematocrit 37.7 35.0 - 47.0 %    MCV 87 80 - 100 fL    MCH 28.2 27.0 - 34.0 pg    MCHC 32.2 32.0 - 36.0 g/dL    RDW 14.8 (H) 11.0 - 14.5 %    Platelets 204 140 - 440 thou/uL    MPV 7.9 7.0 - 10.0 fL    Neutrophils % 62 50 - 70 %    Lymphocytes % 22 20 - 40 %    Monocytes % 9 2 - 10 %    Eosinophils % 7 (H) 0 - 6 %    Basophils % 1 0 - 2 %    Neutrophils Absolute 5.9  2.0 - 7.7 thou/uL    Lymphocytes Absolute 2.1 0.8 - 4.4 thou/uL    Monocytes Absolute 0.9 0.0 - 0.9 thou/uL    Eosinophils Absolute 0.6 (H) 0.0 - 0.4 thou/uL    Basophils Absolute 0.1 0.0 - 0.2 thou/uL   Vitamin B12   Result Value Ref Range    Vitamin B-12 >2,000 (H) 213 - 816 pg/mL   Gynecologic Cytology (PAP Smear)   Result Value Ref Range    Case Report       Gynecologic Cytology Report                       Case: [id number]                                   Authorizing Provider:  Marie Bryan,     Collected:           01/20/2020 1021                                     PA-C                                                                         Ordering Location:     [facility]  Received:            01/20/2020 1022                                     Medicine/OB                                                                  First Screen:          Ashley Griffiths, CT                                                                           (ASCP)                                                                       Rescreen:              Liz Gibbons CT                                                                               (ASCP)                                                                       Specimen:    SUREPATH PAP, SCREENING, Endocervical/cervical                                             Interpretation  Negative for squamous intraepithelial lesion or malignancy.      Negative for squamous intraepithelial lesion or malignancy    Result Flag Normal Normal    Specimen Adequacy       Satisfactory for evaluation, endocervical/transformation zone component present    HPV Reflex? Yes regardless of result     HIGH RISK No     LMP/Menopause Date post-menopausal bleeding 12/25/19     Abnormal Bleeding Yes[post menopausal bleeding     Pt Status n/a     Birth Control/Hormones None     Previous Normal/Date 2-3 yrs ago     Prev Abn Date/Dx none known     Cervical  Appearance normal    HPV High Risk DNA Cervical   Result Value Ref Range    HPV Source SurePath     HPV16 DNA Negative NEG    HPV18 DNA Negative NEG    Other HR HPV Negative NEG    Final Diagnosis SEE NOTES       Comment:      This patient's sample is negative for HPV DNA.  This test was developed and its performance characteristics determined by the  [facility], [facility]. It  has not been cleared or approved by the FDA. The laboratory is regulated under  CLIA as qualified to perform high-complexity testing. This test is used for  clinical purposes. It should not be regarded as investigational or for  research.  (Note)  METHODOLOGY:  The Roche lisa 4800 system uses automated extraction,  simultaneous amplification of HPV (L1 region) and beta-globin,  followed by  real time detection of fluorescent labeled HPV and beta  globin using specific oligonucleotide probes . The test specifically  identifies types HPV 16 DNA and HPV 18 DNA while concurrently  detecting the rest of the high risk types (31, 33, 35, 39, 45, 51,  52, 56, 58, 59, 66 or 68).    COMMENTS:  This test is not intended for use as a screening device  for women under age 30 with normal cervical   cytology.  Results should  be correlated with cytologic and histologic findings. Close clinical  followup is recommended.        Specimen Description Cervical Cells       Comment:        Performed and/or entered by:  [address] [address]        Your Pap test was normal.  Next Pap due in 3 to 5 years  No other vaginal infections.  Normal cholesterol and blood sugars.  Vitamin D level is just a little bit low.  Normal vitamin B12 level.  Overall your blood cell counts are normal.  One type of white blood cell, eosinophils, is a little bit high.  Sometimes this can be high if you are having allergies, other times it can be high if you have a stomach  infection.  It might be a little high because of your lupus.  If you are having any gastrointestinal problems like chronic diarrhea or abdominal pain, we may want to do some more stool tests and make sure you do not have an infection causing the symptoms.  If you are feeling fine, we can just monitor this for now.    Please call with questions or contact us using [employer].    Sincerely,      Electronically signed by Marie Bryan PA-C

## 2021-06-21 NOTE — LETTER
Letter by Roland Diaz DO at      Author: Roland Diaz DO Service: -- Author Type: --    Filed:  Encounter Date: 10/14/2020 Status: (Other)         Bart Kraft  [address] [place] [address]             October 14, 2020         Dear Ms. Kraft,    Below are the results from your recent visit:    Resulted Orders   XR Cervical Spine 2 - 3 VWS    Narrative    EXAM: XR CERVICAL SPINE 2 - 3 VWS  LOCATION: [facility]  DATE/TIME: 9/11/2020 9:06 AM    INDICATION: Neck pain. Suspected spondyloarthropathy.  COMPARISON: None.      Impression    No fracture or subluxation. Straightening of the usual cervical lordosis. Moderate degenerative disc disease and spondylosis at C5-C6 and C6-C7. No erosions. No evidence of inflammatory spondyloarthropathy.    Incidental calcified granulomata and calcified lymph nodes in the left upper lobe and mediastinum.       Sorry for the delay in commenting on your test results, as I was away over the past few weeks.     IMPRESSION:   No fracture or subluxation. Straightening of the usual cervical lordosis. Moderate degenerative disc disease and spondylosis at C5-C6 and C6-C7. No erosions. No evidence of inflammatory spondyloarthropathy. Incidental calcified granulomata and calcified lymph nodes in the left upper lobe and mediastinum.     Degenerative findings mentioned are consistent with having signs of Osteoarthritis. Osteoarthritis represents gradual wearing down with time of the protective cartilage in a joint.     Regarding incidental finding of calcified granulomas: The most common cause of lung granulomas in the [place] is prior ex[osure to histoplasmosis (a fungal infection)  that primarily affects the lungs. Most people with pulmonary histoplasmosis recover on their own and never suspect that they have the disease because signs and symptoms are rare.     Calcified granulomas in patients without symptoms almost never require treatment or  even follow-up chest X-rays. If experiencing fevers or cough or shortness of breath recommend patient discuss result further with their PCP.         Sincerely,        Electronically signed by Roland Diaz,

## 2021-06-22 NOTE — PROGRESS NOTES
ASSESSMENT: Bart Kraft is a 53 y.o. female  with a BMI of Body mass index is 21.7 kg/m . with past medical history significant for anemia, fibromyalgia, gastric ulcers, who presents today for new patient evaluation of chronic neck pain with bilateral arm pain that is thought to be cervical radicular/radiculitis pain secondary to a disc herniation at the C4-5 level.  The patient also has thoracic spine pain as well as chronic bilateral low back pain.  There is thought to be a large myofascial pain component secondary to her fibromyalgia.  At this time the patient is without any focal neurologic deficits.  She is without any red flag signs/symptoms or any upper motor neuron signs/symptoms.    NDI:  48%  WHO-5:  15 (the patient is not interested in behavioral health services).    PLAN:  A shared decision making model was used.  The patient's values and choices were respected.  The following represents what was discussed and decided upon by the physician and the patient.      1.  DIAGNOSTIC TESTS: The patient's MRI of the cervical spine from September 2018 at [facility] was personally reviewed today by the physician with the physician performing her own interpretation.  Images were shown to the patient and the findings were explained.  The patient does have a right paracentral disc herniation at the C4-5 level there are mild degenerative changes seen at the C5-6 and C6-7 levels but without any significant central canal or foraminal stenosis.  -Patient was offered bilateral upper extremity EMGs given her bilateral upper extremity (right greater than left).  She declines at this time stating that the numbness and tingling is tolerable and not concern sure if her symptoms persist and/or worsen.  2.  PHYSICAL THERAPY: She is interested in returning to physical therapy to update her home exercise program in addition to trialing home cervical traction.  [facility]  and she Abebe has an established relationship with this physical therapy location.  An order was provided today.  3.  MEDICATIONS: Discussed with the patient that cyclobenzaprine is a very effective pain medication for patients with fibromyalgia.  The patient wanted to try it so cyclamens pain 5 mg was prescribed today.  She can take 1 or 2 tablets every 8 hours as needed for pain.  She is cautioned that this medication may cause drowsiness.  She should not work/drive until she knows how the cyclobenzaprine affects her.  She can take this with the gabapentin and the oxycodone.  -Discussed with the patient that oxycodone can be detrimental in patients with fibromyalgia.  This is due to a phenomenon known as opioid-induced hyperalgesia.  She has been on this oxycodone for approximately 1 year at the discretion of her primary care physician.  We will leave it to her and her primary care physician about whether to taper off of the oxycodone.  -The patient can continue the gabapentin 800 mg 3 times a day.  4.  INTERVENTIONS: No injections are recommended at this time.  Patient is a fibromyalgia often have increased pain with injections.  There is not a definitive spine problem at that would require an injection at this time.  5.  PATIENT EDUCATION:   -Discussed a diagnosis of fibromyalgia that it typically responds best to 30 minutes of low impact aerobic exercise every day.  The patient reports that she tries to do some form of exercise on a regular basis.  Discussed that it should be low impact aerobic exercise, not just increased activity from work and taking care of children.  Walking, biking, swimming are all excellent forms of low impact aerobic exercise.  -.  The patient to continue working on quitting smoking.  Smokers have higher levels of pain the people who do not smoke.  This will likely help with her chronic pain if she can stop smoking.  -Encouraged good sleep hygiene with the patient which includes not  drinking caffeine after noon (the patient states that she does not drink caffeinated beverages to begin with), developing a sleep ritual, and only sleeping in her bed (no reading or watching TV in bed).  -All of her questions were answered to her satisfaction today.  She was in agreement with the treatment plan and expressed gratitude for the physician for the care received today.  6.  FOLLOW-UP:   The patient was offered a face-to-face follow-up visit in 6 weeks, but she preferred to have nurse navigation call her.  If she is doing better in 6 weeks, she is welcome to follow-up as needed.  If she is not doing better, the nurse can offer her a follow-up appointment.  She is welcome to return at any time or contact the physician if she has any questions, concerns, or any significant worsening of her symptoms.        SUBJECTIVE:  Bart Kraft  Is a 53 y.o. female who presents today for new patient evaluation of neck pain, thoracic spine pain and low back pain.  The patient reports that she has had pain for at least 10 years, but is worsened over the last 5 years or so.  The worst area pain is in the neck with radiation into the shoulders.  She does get pain going down the right arm, and at times her right hand can get numb and tingly.  She occasionally has pain in the left arm, but most of the radicular symptoms are in the right arm.  She does feel like she has weakness of the arms and that she has difficulty lifting things.  Her hands can be very swollen and sore at times.  She also has thoracic spine pain.  Her low back pain is chronic.  She reports that at age 20 she fell and cracked her tailbone.  She has learned to live with a low back pain.  She does have some shooting pain into the legs, but she states that this is pretty much just at night.  She really does not have the shooting pains in the legs during the day.  She denies any numbness tingling or weakness in the legs.  Pain overall is worse with lifting,  though it is fairly constant.  She does feel better with taking gabapentin 800 mg 3 times a day.  Her oxycodone also provide some relief.  She has had massage therapy in the past and this provides significant relief.  She is done extensive physical therapy in the past.  She states that she has to do home exercise program every day in order just to keep moving.  She says that she is taking care of 4 of her grandchildren right now which keeps her quite busy and she has to remain quite active for them.    Medications:  Reviewed and correct in the chart.     Allergies: Reviewed and penicillin/Augmentin caused nausea and vomiting.    PMH:  Reviewed and anemia, gastric ulcers, fibromyalgia.    PSH:  Reviewed and gastric bypass, cholecystectomy.    Family History:  Reviewed and breast cancer, throat/stomach cancer, diabetes, heart disease.    Social History: The patient is  and is a homemaker.  She smokes approximately 1 pack/day but is working on quitting.  She very rarely drinks alcohol.  She denies any tobacco or illicit drug use.    ROS: Positive for poor sleep quality secondary to pain, back pain, joint pain, headaches (1-2 major headaches per month and 1-2 minor headaches per week), cold/heat intolerance.  Specifically negative for dysphagia, imbalance, fine motor skill difficulties, bowel/bladder dysfunction, fevers,chills, appetite changes, unexplained weight loss.   Otherwise 13 systems reviewed are negative.  Please see the patient's intake questionnaire from today for details.      OBJECTIVE:  PHYSICAL EXAMINATION:  CONSTITUTIONAL:  Vital signs as above.  No acute distress.  The patient is well nourished and well groomed.  PSYCHIATRIC:  The patient is awake, alert, oriented to person, place, time and answering questions appropriately with clear speech.    HEENT:  Sclera are non-injected.  Supple anterior and posterior neck.    SKIN:  Skin over the face, bilateral upper extremities, and posterior torso is  clean, dry, intact without rashes.  LYMPH NODES:  No palpable or tender anterior/posterior cervical, submandibular, or supraclavicular lymph nodes.    MUSCLE STRENGTH:  5/5 strength for the bilateral shoulder abductors, elbow flexors/extensors, wrist extensors, finger flexors/abductors.  NEURO:    2/4 symmetric biceps, brachioradialis, triceps reflexes bilaterally.  Sensation to prick is intact in all of the digits of both upper extremities.  Negative Mcneil's bilaterally.    VASCULAR:  2/4 radial pulses bilaterally.  Warm upper limbs bilaterally.  Capillary refill in the upper extremities is less than 1 second.  MUSCULOSKELETAL: She has largely normal range of motion of the cervical spine with flexion and extension.  Does have mild to moderate restriction with bilateral cervical sidebending and bilateral cervical rotation.  She reports pain with all of these motions.  Spurling's test bilaterally is negative for any reproduction of radicular arm symptoms, though she does have neck pain with Spurling's maneuver bilaterally.  Patient does have significant tenderness over the bilateral cervical paraspinal muscles, bilateral upper trapezius muscles, thoracic paraspinal muscles from T1 down to T12.  She also has tenderness over the lumbar paraspinal muscles from L1 down to L5 with increased tenderness over the bilateral sacroiliac joints.  Straight leg raising test does reproduce some pain in the bilateral low back.  Patient does not have any significant pain with bilateral hip range of motion testing.  Fabere's test is negative bilaterally.

## 2021-06-23 NOTE — TELEPHONE ENCOUNTER
"Pt called in follow up to appointment on 12/7/18. Pt states she is \"doing better\" and will follow up as needed.   "
----- Message from Clarence Hawk CMA sent at 12/7/2018  1:02 PM CST -----  Nurse call 6 weeks from appointment on 12/7/18.   
no

## 2021-06-28 NOTE — PROGRESS NOTES
Progress Notes by Vickie Rinaldi CNP at 2/4/2020 10:40 AM     Author: Vickie Rinaldi CNP Service: -- Author Type: Nurse Practitioner    Filed: 2/4/2020 12:20 PM Date of Service: 2/4/2020 10:40 AM Status: Signed    : Vickie Rinaldi CNP (Nurse Practitioner)       [facility]  New patient consultation        CC-  Chief Complaint   Patient presents with   ? Consult     Lupus, Fibromyalgia     Bart Kraft 54 y.o. is here today, sent to me by  to discuss the patients pain.  Associated symptoms with pain include establishing care with the  pain center due to the length of the drive that it was taking her to get to [facility] in [place]. Patients notes that she is at home and recently gain custody- foster care, her grandchildren that are all small that she is currently caring for.     Alleviating factors: Include- to treat her ongoing chronic pain related to her fibromyalgia type pain, she notes that the opioid seem to help her neck pain as well as her hands.   Aggravating factors: activity including bending, standing, laying down or lifting, The patient denies using any assistive devices to help with mobilization.  Pain level today: On a scale of 1-10, the patient rates their pain at a 8/10 tolerable level is 5-6/10  Function Rating: patient does not work, she was a previously a  and notes that her hands hurt. Currently she reports that she takes care of her grandkids.       HPI  Past Medical History:   Diagnosis Date   ? Intestinal obstruction (H) 5/20/2008   ? Low bone mass 7/19/2018   ? Lupus (H)    ? Mild intermittent asthma 9/19/2019   ? Stomach ulcer      Past Surgical History:   Procedure Laterality Date   ? CHOLECYSTECTOMY  05/10/2003   ? ESOPHAGOGASTRODUODENOSCOPY N/A 8/4/2015    Procedure: ESOPHAGOGASTRODUODENOSCOPY w/ biopsy by forceps;  Surgeon: Feliciano Grant MD;  [facility];  Service:    ? REVISION OF PREVIOUS RNY GASTRIC  BYPASS  4/14/2010    w/total division of the stomach, redo of gastrojejunostomy and lysis of adhesions   ? ZULEMA-EN-Y PROCEDURE  5/11/2004    MD Francisca   ? TONSILLECTOMY     ? TUBAL LIGATION       Family History   Problem Relation Age of Onset   ? Brain cancer Mother    ? No Medical Problems Father    ? No Medical Problems Brother    ? Breast cancer Maternal Aunt    ? Throat cancer Maternal Grandmother    ? Lung cancer Maternal Grandmother    ? Diabetes Paternal Grandmother    ? Rheum arthritis Paternal Grandmother    ? Breast cancer Paternal Grandmother      Social History     Tobacco Use   Smoking Status Current Every Day Smoker   ? Packs/day: 1.50   ? Years: 35.00   ? Pack years: 52.50   ? Types: Cigarettes   Smokeless Tobacco Never Used     Social History     Substance and Sexual Activity   Alcohol Use No     Social History     Substance and Sexual Activity   Drug Use No     Social History     Substance and Sexual Activity   Sexual Activity Not on file       Chemical Dependency History: Patient Endorses tobacco use, alcohol use, illicit substance use including THC.  Denies any chemical dependency evaluation or treatment in the past.  Denies any legal issues related to substance use.    Psychiatric History:  Patient reports no current psychiatrist or psychologist.  Denies any suicidal ideation.  Denies any hospitalizations for mental illness.    ORT     moderate to high risk    Pertinent Pain Medications:    She currently takes gabapentin and oxycodone, recreational cannabis      Current Outpatient Medications:   ?  albuterol (PROAIR HFA;PROVENTIL HFA;VENTOLIN HFA) 90 mcg/actuation inhaler, Inhale 2 puffs every 4 (four) hours as needed for wheezing., Disp: 1 Inhaler, Rfl: 1  ?  amitriptyline (ELAVIL) 10 MG tablet, Take 1 tablet (10 mg total) by mouth at bedtime., Disp: 90 tablet, Rfl: 3  ?  ergocalciferol (VITAMIN D2) 1,250 mcg (50,000 unit) capsule, Take 1 capsule (50,000 Units total) by mouth 2 (two) times a  "week., Disp: 24 capsule, Rfl: 3  ?  ferrous fumarate 325 mg (106 mg iron) Tab, Take 325 mg by mouth daily., Disp: 90 tablet, Rfl: 3  ?  gabapentin (NEURONTIN) 400 MG capsule, TK 2 CS PO 3 TO 4 TIMES PER DAY, Disp: 180 capsule, Rfl: 3  ?  omeprazole (PRILOSEC) 20 MG capsule, Take 2 capsules (40 mg total) by mouth 2 (two) times a day before meals., Disp: 180 capsule, Rfl: 3  ?  Oxycodone HCl 20 mg Tab, Take 1 tablet by mouth every 4 (four) hours as needed., Disp: , Rfl: 0  ?  PNV,calcium 72/iron,carb/folic (PRENATAL PLUS ORAL), Take by mouth daily., Disp: , Rfl:     Therapeutic interventions previously tried-   PT, recreational cannabis, opioids.      Review of Systems:  12 point systems were reviewed with pt as documented on pt health form of 2/4/2020. ROS was positive for multiple comorbities the rest of the systems were pertinent negative.    Exam  Vitals:    02/04/20 1045   BP: (!) 141/91   Patient Site: Right Arm   Patient Position: Sitting   Pulse: 67   Weight: 137 lb 4.8 oz (62.3 kg)   Height: 5' 3\" (1.6 m)     Constitutional-General:  Pleasant, straightforward, healthy appearing individual.   Psych-Mental Status: A & O in no acute distress. Speech is fluent.  Recent and remote memory are intact.  Attention span and concentration are normal. Displays appropriate mood and affect.     Exam was not completed due to patients irritability.     Lab:  Last UDS on 2/4/2020 was taken today and is pending.      Imaging: [facility] radiology       :  Dated 2/4/2020 was reviewed with the patient  Paper copy reviewed.     Assessment -  Todays diagnosis includes   1. Other chronic pain      Patient presents to the clinic to establish cares as she does not want to drive to  [facility] in [facility] for her pain management. Previously she was a patient of [facility]. She notes that her  also established care here with Yoanna KEENE, yesterday.  She notes that she has lupus as well as fibromyalgia and takes gabapentin and " "oxycodone for this, she notes that she also has neck pain , but \"no one is cutting on her\" per her report she has had a surgical evaluation. She endorses use of recreational cannabis with her oxycodone. Discussed that the concurrent use is not allowed or endorsed with opioids. Patient notes that she is not able to go without recreational cannabis for 6 months. She also notes that [facility] was going to certify her but knew she was coming here for an evaluation so stated that they wanted to \"wait\".  At this point it is recommended that she not use opioids in conjunction with recreational cannabis regardless of the reason. The [facility] pain center will not provide opioids for the patient. If she is interested in medical cannabis she will need to establish with behavorial health as well as have a down trending UDT that is significant in the use of THC over a period of time, a KELI for [facility] was requested. If patient is unable to comply with the requests certification will not be granted by provider due to concerns for diversion.     Reviewing her co-morbidites as lupus, fibromyalgia, chronic neck pain and right shoulder pain. Discussed that a fair amount of her symptoms are re-lated to nutrition deficits.         Diagnoses- fibromyalgia, current ulcerations, previous gastric bypass,     Patient set goals today in the office to achieve with the pain centers help. They are:  1- to get her pain under control     Plan Interventions recommended today:  Assessment tool-        Follow up after the intake packet for behavorial health is completed for medical cannabis certification, OVL at the next visit.     Sign a KLEI at the  so we can obtain your records for our next visit- for [facility].    Please see your current provider for any continued prescription, they may choose to provide you prescriptions of your narcotics until we have your urine screen back. They will continue to manage your general health and have " requested you see the pain center for pain management. Please discuss any health concerns with your PCP     [place] Precautions are taken with every patient which includes a  report and drug screen. A UA will be taken today for baseline screening to trend the medical cannabis. If it reflects a lower dose consistently, medical cannabis can be considered.    Behavioral Therapy- if interested in medical cannabis ok to get an intake packet for evaluation- recreational verses self medication for pain.     As discussed the pain center will not manage opioids as well as recreational cannabis as this is diversion.       Patient reminders:   Diagnostics: UDT/SWAB collected 2/4/2020 and results are pending.  UDT/SWAB:  Patient required a random Urine Drug Testing, due to the need to comply with [place] Model Policy Guidelines and CDC Guideline for the use of any controlled substances. This is to ensure that patient is compliant with treatment, and monitor for risks such as diversion, abuse, or any other aberrant behaviors. Patient is either being considered for or taking a controlled substance. Unexpected findings will be discussed and treatment decision may be adjusted. Testing is being implemented across the board randomly w/o bias related to age, race, gender, socioeconomic status or Protestant affiliation.     SAFETY REMINDERS  No alcohol while taking controlled substances. Alcohol is not an illegal substance, it is unsafe to use in combination. It is a build up of substances in the body that can be extremely hazardous and may cause respirations to slow to a dangerous rate resulting in hospitalization, brain damage, or death.    Opioid medications have been associated with sharp rise in unintentional overdose and death.  Overdose is a condition characterized by the consumption in excess of a particular drug causing adverse effects. This can happen b/c you are sick, accidentally or intentionally took an extra dose,  are on multiple medication that can interact. Someone took your medication and they are not use to the medication.  Symptoms of overdose include:   !breathing slow and shallow, erratic or not at all  !pinpoint pupils, hallucinations  !confusion  !muscle jerks, slack muscles   !extreme sleepiness or loss of alertness   !awake but not able to talk   !face pale or clammy, vomiting, for lighter skinned people, the skin tone turns bluish purple, for darker skinned people, it turns grayish or ashen   If in a situation where overdose is a concern engage the emergency response system (dial 911).    In one study it was noted that 80% of unintentional overdoses occurred in people who were taking a combination of opioids and benzodiazepines.    Do not sell, loan, borrow or share your opioid medication with anyone. Deaths have occurred as a result of this practice. It is illegal and patients are being prosecuted.     Prevent unexpected access/loss of medication: Keep medication locked. Only carry what you need with you.    The patient agrees to the plan and has no further questions, if questions arise the patient knows to call [phone].     10:57 AM        Thank you for this consult and opportunity to assist with this patients care.    Vickie Rinaldi, [facility]  [address]  [place] [address]  Ph: [phone]  Fax: [phone]

## 2021-07-03 NOTE — ADDENDUM NOTE
Addendum Note by Cami Wong CMA at 2/4/2020 10:40 AM     Author: Cami Wong CMA Service: -- Author Type: Certified Medical Assistant    Filed: 2/10/2020 12:32 PM Date of Service: 2/4/2020 10:40 AM Status: Signed    : Cami Wong CMA (Certified Medical Assistant)    Encounter addended by: Cami Wong CMA on: 2/10/2020 12:32 PM      Actions taken: Problem List modified, Visit diagnoses modified,   Diagnosis association updated, Order list changed

## 2021-08-30 DIAGNOSIS — M54.2 CHRONIC NECK PAIN: Primary | ICD-10-CM

## 2021-08-30 DIAGNOSIS — L93.2 CUTANEOUS LUPUS ERYTHEMATOSUS: ICD-10-CM

## 2021-08-30 DIAGNOSIS — G89.29 CHRONIC NECK PAIN: Primary | ICD-10-CM

## 2021-08-30 DIAGNOSIS — M79.7 FIBROMYALGIA: ICD-10-CM

## 2021-08-30 DIAGNOSIS — R76.8 POSITIVE ANA (ANTINUCLEAR ANTIBODY): ICD-10-CM

## 2021-08-30 RX ORDER — CYCLOBENZAPRINE HCL 10 MG
TABLET ORAL
COMMUNITY
Start: 2020-09-01 | End: 2021-08-30

## 2021-08-30 NOTE — TELEPHONE ENCOUNTER
Refill request from [facility] [place], [place]    Medication: Cyclobenzaprine 10mg  Last Refill: 7/6/21  Last OV: 2/3/21  Last Lab: 11/16/20  No future appts

## 2021-09-02 RX ORDER — CYCLOBENZAPRINE HCL 10 MG
TABLET ORAL
Qty: 90 TABLET | Refills: 0 | Status: SHIPPED | OUTPATIENT
Start: 2021-09-02

## 2021-09-02 NOTE — TELEPHONE ENCOUNTER
Please discuss with the patient:    90-day supply of Flexeril refilled, recommend patient schedule for a follow-up visit first within 3 months.  Recommend having labs performed approximately 1 week prior to visit with us.  There are some lab orders dated February 3, 2021, apparently not pursued (please verify), may have , if not pursued please reorder and also include AST/ALT, thanks.

## 2021-09-09 NOTE — TELEPHONE ENCOUNTER
Talked with patient; she is out running errands and does not have her calendar. She stated she will call back later this afternoon or tomorrow morning to schedule lab and follow up with Dr. Diaz.

## 2021-10-07 NOTE — TELEPHONE ENCOUNTER
I spoke to the patient and she is very busy and stated she will call back to schedule.  She stated she would not like a call back.

## 2022-01-18 RX ORDER — GABAPENTIN 400 MG/1
CAPSULE ORAL
COMMUNITY
Start: 2021-08-25 | End: 2023-05-26

## 2022-01-18 RX ORDER — GABAPENTIN 400 MG/1
CAPSULE ORAL
Status: CANCELLED | OUTPATIENT
Start: 2022-01-18

## 2022-01-20 NOTE — TELEPHONE ENCOUNTER
Outpatient Medication Detail     Disp Refills Start End TRENT   gabapentin (NEURONTIN) 400 MG capsule 360 capsule 11 9/17/2020  No   Sig: TK 2 CS PO 3 TO 4 TIMES PER DAY   Sent to pharmacy as: gabapentin 400 mg capsule (NEURONTIN)   E-Prescribing Status: Receipt confirmed by pharmacy (9/17/2020  1:09 PM CDT)       gabapentin (NEURONTIN) 400 MG capsule [[id number]]    Electronically signed by: Angela Jolly RN on 09/17/20 1243 Status: Active   Ordering user: Angela Jolly RN 09/17/20 1243 Ordering provider: Marie Bryan PA-C   Authorized by: Marie Bryan PA-C   Frequency:  09/17/20 - Until Discontinued Released by: Angela Jolly RN 09/17/20 1243   Diagnoses  Fibromyalgia [M79.7]     Routing refill request to provider for review/approval because:  Drug not on the [facility] refill protocol   A break in medication  PCP information not populated in chart.    Last office visit provider:  2/1/21     Requested Prescriptions   Pending Prescriptions Disp Refills     gabapentin (NEURONTIN) 400 MG capsule       Sig: TAKE 2 CAPSULES BY MOUTH 3 TO 4 TIMES PER DAY       There is no refill protocol information for this order      Signed Prescriptions Disp Refills    gabapentin (NEURONTIN) 400 MG capsule       Sig: TAKE 2 CAPSULES BY MOUTH 3 TO 4 TIMES PER DAY       There is no refill protocol information for this order          Shorty Webb RN 01/20/22 8:20 AM

## 2022-08-19 ENCOUNTER — TRANSFERRED RECORDS (OUTPATIENT)
Dept: HEALTH INFORMATION MANAGEMENT | Facility: CLINIC | Age: 57
End: 2022-08-19

## 2023-05-15 ENCOUNTER — NURSE TRIAGE (OUTPATIENT)
Dept: NURSING | Facility: CLINIC | Age: 58
End: 2023-05-15
Payer: COMMERCIAL

## 2023-05-15 NOTE — TELEPHONE ENCOUNTER
Nurse Triage SBAR    Situation:   -scalp rash    Background:   -Patient calling, It is okay to leave a detailed message at this number.     Assessment:   -on last Wednesday/Thursday she developed scalp rash  -it feels [place] shards of glass in her hair   -neck is stiff  -some are scabbing  -some are draining (clear)  -she dose not think this is shingles or lice  -some swelling  -she can not see the rash to evaluate it    Recommendation:   Go To Office Now  -Warm transferred to central scheduling to make an appointment  -If nothing is available go to /[facility]    CRYSTAL RUDOLPH RN on 5/15/2023 at 9:45 AM      Reason for Disposition    Looks like a boil, infected sore, deep ulcer, or other infected rash (spreading redness, pus)     ANNA    Localized rash is very painful (no fever)    Additional Information    Negative: Sounds like a life-threatening emergency to the triager    Negative: Athlete's Foot suspected (i.e., itchy rash between the toes)    Negative: Insect bite(s) suspected    Negative: Jock Itch suspected (i.e., itchy rash on inner thighs near genital area)    Negative: Localized lump (or swelling) without redness or rash    Negative: Poison ivy, oak, or sumac contact suspected    Negative: Rash of female genital area (vulva)    Negative: Rash of male genital area (penis or scrotum)    Negative: Redness of immunization site    Negative: Shingles suspected (i.e., painful rash, multiple small blisters grouped together in one area of body; dermatomal distribution)    Negative: Small spot, skin growth, or mole    Negative: Wound infection suspected (i.e., pain, spreading redness, or pus; in a cut, puncture, scrape or sutured wound)    Negative: Fever and localized purple or blood-colored spots or dots that are not from injury or friction    Negative: Fever and localized rash is very painful    Negative: Patient sounds very sick or weak to the triager    Protocols used: RASH OR REDNESS - [id number]

## 2023-05-17 ENCOUNTER — OFFICE VISIT (OUTPATIENT)
Dept: FAMILY MEDICINE | Facility: CLINIC | Age: 58
End: 2023-05-17
Payer: COMMERCIAL

## 2023-05-17 ENCOUNTER — TELEPHONE (OUTPATIENT)
Dept: FAMILY MEDICINE | Facility: CLINIC | Age: 58
End: 2023-05-17

## 2023-05-17 VITALS
DIASTOLIC BLOOD PRESSURE: 103 MMHG | BODY MASS INDEX: 21.53 KG/M2 | TEMPERATURE: 98.5 F | OXYGEN SATURATION: 99 % | SYSTOLIC BLOOD PRESSURE: 150 MMHG | WEIGHT: 134 LBS | HEART RATE: 105 BPM | HEIGHT: 66 IN | RESPIRATION RATE: 16 BRPM

## 2023-05-17 DIAGNOSIS — B02.8 HERPES ZOSTER WITH OTHER COMPLICATION: Primary | ICD-10-CM

## 2023-05-17 DIAGNOSIS — M79.2 NEURITIS: ICD-10-CM

## 2023-05-17 LAB
ERYTHROCYTE [DISTWIDTH] IN BLOOD BY AUTOMATED COUNT: 17.1 % (ref 10–15)
HCT VFR BLD AUTO: 37.9 % (ref 35–47)
HGB BLD-MCNC: 11.8 G/DL (ref 11.7–15.7)
MCH RBC QN AUTO: 24.8 PG (ref 26.5–33)
MCHC RBC AUTO-ENTMCNC: 31.1 G/DL (ref 31.5–36.5)
MCV RBC AUTO: 80 FL (ref 78–100)
PLATELET # BLD AUTO: 204 10E3/UL (ref 150–450)
RBC # BLD AUTO: 4.75 10E6/UL (ref 3.8–5.2)
WBC # BLD AUTO: 8.5 10E3/UL (ref 4–11)

## 2023-05-17 PROCEDURE — 87389 HIV-1 AG W/HIV-1&-2 AB AG IA: CPT | Performed by: PHYSICIAN ASSISTANT

## 2023-05-17 PROCEDURE — 36415 COLL VENOUS BLD VENIPUNCTURE: CPT | Performed by: PHYSICIAN ASSISTANT

## 2023-05-17 PROCEDURE — 85027 COMPLETE CBC AUTOMATED: CPT | Performed by: PHYSICIAN ASSISTANT

## 2023-05-17 PROCEDURE — 99204 OFFICE O/P NEW MOD 45 MIN: CPT | Performed by: PHYSICIAN ASSISTANT

## 2023-05-17 RX ORDER — OXYCODONE HYDROCHLORIDE 5 MG/1
5 TABLET ORAL EVERY 6 HOURS PRN
Qty: 6 TABLET | Refills: 0 | Status: SHIPPED | OUTPATIENT
Start: 2023-05-17 | End: 2023-05-20

## 2023-05-17 RX ORDER — DIAPER,BRIEF,INFANT-TODD,DISP
EACH MISCELLANEOUS 2 TIMES DAILY
Qty: 56 G | Refills: 0 | Status: SHIPPED | OUTPATIENT
Start: 2023-05-17

## 2023-05-17 RX ORDER — PREDNISONE 10 MG/1
TABLET ORAL
Qty: 40 TABLET | Refills: 0 | Status: SHIPPED | OUTPATIENT
Start: 2023-05-17

## 2023-05-17 ASSESSMENT — ASTHMA QUESTIONNAIRES
ACT_TOTALSCORE: 24
QUESTION_5 LAST FOUR WEEKS HOW WOULD YOU RATE YOUR ASTHMA CONTROL: WELL CONTROLLED
QUESTION_4 LAST FOUR WEEKS HOW OFTEN HAVE YOU USED YOUR RESCUE INHALER OR NEBULIZER MEDICATION (SUCH AS ALBUTEROL): NOT AT ALL
QUESTION_1 LAST FOUR WEEKS HOW MUCH OF THE TIME DID YOUR ASTHMA KEEP YOU FROM GETTING AS MUCH DONE AT WORK, SCHOOL OR AT HOME: NONE OF THE TIME
ACT_TOTALSCORE: 24
QUESTION_3 LAST FOUR WEEKS HOW OFTEN DID YOUR ASTHMA SYMPTOMS (WHEEZING, COUGHING, SHORTNESS OF BREATH, CHEST TIGHTNESS OR PAIN) WAKE YOU UP AT NIGHT OR EARLIER THAN USUAL IN THE MORNING: NOT AT ALL
QUESTION_2 LAST FOUR WEEKS HOW OFTEN HAVE YOU HAD SHORTNESS OF BREATH: NOT AT ALL

## 2023-05-17 ASSESSMENT — PATIENT HEALTH QUESTIONNAIRE - PHQ9
SUM OF ALL RESPONSES TO PHQ QUESTIONS 1-9: 2
10. IF YOU CHECKED OFF ANY PROBLEMS, HOW DIFFICULT HAVE THESE PROBLEMS MADE IT FOR YOU TO DO YOUR WORK, TAKE CARE OF THINGS AT HOME, OR GET ALONG WITH OTHER PEOPLE: NOT DIFFICULT AT ALL
SUM OF ALL RESPONSES TO PHQ QUESTIONS 1-9: 2

## 2023-05-17 NOTE — TELEPHONE ENCOUNTER
Reason for Call:  Appointment Request    Patient requesting this type of appt:  Pt has shingles and was seen in the ED.  Please see triage note from 5/15/23.    Pt states she was given generic Valtrex, however, the pain on her scalp, and right ear and neck is unbearable. Blisters are oozing and needs more than Tylenol.  Pain is preventing pt from sleeping.     Requested provider: anyone    Comments: Pt appt is with Argentina today at 1210 pm     Okay to leave a detailed message?: No task is completed.     Call taken on 5/17/2023 at 10:05 AM by KEL Leahy

## 2023-05-17 NOTE — PROGRESS NOTES
Assessment & Plan     Herpes zoster with other complication  -taking Valtrex, using topicals for the itching  -pain is unbearable so will add prednisone taper and small # of oxycodone which she has used in the past for other pain  -plan to f/u next week, sooner with concerns.    -HIV and CBC pending  - predniSONE (DELTASONE) 10 MG tablet; Take 6 tablets once daily for 3 days, then 4 tablets once daily for 3 days, then 2 tablets once daily for 3 days, then 1 tablet once daily for 3 days  - oxyCODONE (ROXICODONE) 5 MG tablet; Take 1 tablet (5 mg) by mouth every 6 hours as needed for severe pain  - hydrocortisone (CORTAID) 1 % external ointment; Apply topically 2 times daily  - CBC with platelets; Future  - HIV Antigen Antibody Combo; Future    Neuritis  - predniSONE (DELTASONE) 10 MG tablet; Take 6 tablets once daily for 3 days, then 4 tablets once daily for 3 days, then 2 tablets once daily for 3 days, then 1 tablet once daily for 3 days  - oxyCODONE (ROXICODONE) 5 MG tablet; Take 1 tablet (5 mg) by mouth every 6 hours as needed for severe pain  - hydrocortisone (CORTAID) 1 % external ointment; Apply topically 2 times daily  - CBC with platelets; Future  - HIV Antigen Antibody Combo; Future      Nicotine/Tobacco Cessation:  She reports that she has been smoking cigarettes, cigarettes, and cigarettes. She has a 52.50 pack-year smoking history. She has never used smokeless tobacco.  Nicotine/Tobacco Cessation Plan:   Information offered: Patient not interested at this time      Argentina Momin PA-C  [facility]   Bart is a 57 year old, presenting for the following health issues:  Shingles (Pt said she has shingles on her scalp on her right side and has experienced pain all over the side. )        5/17/2023    12:13 PM   Additional Questions   Roomed by Carmen Almeida   Accompanied by Self     -shingles for the past 6 days, was in the ED 2 days ago  -started on Valtrex, pain  "is unbearable  -has lesions in the scalp and along the neck.  Cannot sleep due to the pain, feels exhausted.      -has not had the shingles vaccine    -hx of SLE and is not on any immunocompromising medications    History of Present Illness       Reason for visit:  Shingles  Symptom onset:  3-7 days ago  Symptoms include:  Pain  Symptom intensity:  Severe  Symptom progression:  Worsening  Had these symptoms before:  No    She eats 2-3 servings of fruits and vegetables daily.She consumes 0 sweetened beverage(s) daily.She exercises with enough effort to increase her heart rate 10 to 19 minutes per day.  She exercises with enough effort to increase her heart rate 7 days per week. She is missing 1 dose(s) of medications per week.    Today's PHQ-9         PHQ-9 Total Score: 2    PHQ-9 Q9 Thoughts of better off dead/self-harm past 2 weeks :   Not at all    How difficult have these problems made it for you to do your work, take care of things at home, or get along with other people: Not difficult at all     Review of Systems   Constitutional, HEENT, cardiovascular, pulmonary, gi and gu systems are negative, except as otherwise noted.      Objective    BP (!) 142/97 (BP Location: Left arm, Patient Position: Sitting, Cuff Size: Adult Regular)   Pulse 107   Temp 98.5  F (36.9  C) (Temporal)   Resp 16   Ht 1.676 m (5' 6\")   Wt 60.8 kg (134 lb)   SpO2 99%   BMI 21.63 kg/m    Body mass index is 21.63 kg/m .  Physical Exam   GENERAL: alert and in mild distress due to pain  NECK: bilateral anterior cervical adenopathy  SKIN: no suspicious lesions or rashes, erythema - right neck and vesicle - right neck, neck, scalp and ear             Prior to immunization administration, verified patients identity using patient s name and date of birth. Please see Immunization Activity for additional information.     Screening Questionnaire for Adult Immunization    Are you sick today?   Yes   Do you have allergies to medications, food, a " vaccine component or latex?   Yes   Have you ever had a serious reaction after receiving a vaccination?   No   Do you have a long-term health problem with heart, lung, kidney, or metabolic disease (e.g., diabetes), asthma, a blood disorder, no spleen, complement component deficiency, a cochlear implant, or a spinal fluid leak?  Are you on long-term aspirin therapy?   No   Do you have cancer, leukemia, HIV/AIDS, or any other immune system problem?   Yes   Do you have a parent, brother, or sister with an immune system problem?   No   In the past 3 months, have you taken medications that affect  your immune system, such as prednisone, other steroids, or anticancer drugs; drugs for the treatment of rheumatoid arthritis, Crohn s disease, or psoriasis; or have you had radiation treatments?   No   Have you had a seizure, or a brain or other nervous system problem?   No   During the past year, have you received a transfusion of blood or blood    products, or been given immune (gamma) globulin or antiviral drug?   No   For women: Are you pregnant or is there a chance you could become       pregnant during the next month?   No   Have you received any vaccinations in the past 4 weeks?   No     Immunization questionnaire was positive for at least one answer.  Notified .      Injection of  given by Tanisha Lares MA. Patient instructed to remain in clinic for 15 minutes afterwards, and to report any adverse reactions.     Screening performed by Tanisha Lares MA on 5/17/2023 at 12:18 PM.

## 2023-05-18 LAB — HIV 1+2 AB+HIV1 P24 AG SERPL QL IA: NONREACTIVE

## 2023-05-24 ENCOUNTER — TELEPHONE (OUTPATIENT)
Dept: FAMILY MEDICINE | Facility: CLINIC | Age: 58
End: 2023-05-24
Payer: COMMERCIAL

## 2023-05-24 DIAGNOSIS — L03.90 CELLULITIS, UNSPECIFIED CELLULITIS SITE: Primary | ICD-10-CM

## 2023-05-24 RX ORDER — CEPHALEXIN 500 MG/1
500 CAPSULE ORAL 3 TIMES DAILY
Qty: 30 CAPSULE | Refills: 0 | Status: SHIPPED | OUTPATIENT
Start: 2023-05-24 | End: 2023-06-03

## 2023-05-24 NOTE — TELEPHONE ENCOUNTER
Contacted patient that Rx for an antibiotic was sent to her pharmacy.    Go to UC if sxs worsen, fevers, intolerable pain etc.  Keep scheduled appointment on 5/26/23.  No further action needed.    Awilda Flores RN  [facility]

## 2023-05-24 NOTE — TELEPHONE ENCOUNTER
Lets start an antibiotic, sounds like it could be infected.  I will send this to her pharmacy.      To  if sxs worsen, fevers, intolerable pain etc.      Argentina Momin PA-C on 5/24/2023 at 4:14 PM

## 2023-05-24 NOTE — TELEPHONE ENCOUNTER
S-(situation):   Patient was seen on 5/17/23 by Argentina RODRIGUEZ for shingles and now has odorous drainage from scalp. Patient washes her hair daily and still having a problem    B-(background):   Patient has an appointment scheduled on 5/26/23  Does not have transportation to get to  today as no appointments in clinic.    A-(assessment):   Clear sticky odorous drainage from scalp where she has shingles  Right ear and neck shingles are better since applying cream  Pain was better, but now 8/10  No fever  Patient finished  Roxicodone    R-(recommendations):   Patient does not have transportation today, so unable to got to   Message routed to Argentina RODRIGUEZ for review / plan.    Awilda Flores RN  [facility]

## 2023-05-26 ENCOUNTER — OFFICE VISIT (OUTPATIENT)
Dept: FAMILY MEDICINE | Facility: CLINIC | Age: 58
End: 2023-05-26
Payer: COMMERCIAL

## 2023-05-26 VITALS
OXYGEN SATURATION: 98 % | RESPIRATION RATE: 16 BRPM | HEIGHT: 66 IN | TEMPERATURE: 97.9 F | HEART RATE: 77 BPM | WEIGHT: 139 LBS | BODY MASS INDEX: 22.34 KG/M2 | SYSTOLIC BLOOD PRESSURE: 117 MMHG | DIASTOLIC BLOOD PRESSURE: 79 MMHG

## 2023-05-26 DIAGNOSIS — L03.818 CELLULITIS OF OTHER SPECIFIED SITE: ICD-10-CM

## 2023-05-26 DIAGNOSIS — R10.13 DYSPEPSIA: ICD-10-CM

## 2023-05-26 DIAGNOSIS — B02.8 HERPES ZOSTER WITH OTHER COMPLICATION: Primary | ICD-10-CM

## 2023-05-26 PROCEDURE — 99213 OFFICE O/P EST LOW 20 MIN: CPT | Performed by: PHYSICIAN ASSISTANT

## 2023-05-26 RX ORDER — ACETAMINOPHEN 500 MG
500-1000 TABLET ORAL EVERY 6 HOURS PRN
Qty: 40 TABLET | Refills: 1 | Status: SHIPPED | OUTPATIENT
Start: 2023-05-26

## 2023-05-26 RX ORDER — GABAPENTIN 400 MG/1
CAPSULE ORAL
Qty: 180 CAPSULE | Refills: 3 | Status: SHIPPED | OUTPATIENT
Start: 2023-05-26 | End: 2023-09-22

## 2023-05-26 RX ORDER — OXYCODONE HYDROCHLORIDE 5 MG/1
5 TABLET ORAL EVERY 6 HOURS PRN
Qty: 6 TABLET | Refills: 0 | Status: SHIPPED | OUTPATIENT
Start: 2023-05-26 | End: 2023-06-05

## 2023-05-26 NOTE — PROGRESS NOTES
"  Assessment & Plan     Herpes zoster with other complication  -continue with keflex  -try not to scratch  -add oxycodone sparingly  -is taking gabapentin  -f/u next week  -discussed alarm signs and symptoms to monitor for and discussed when to be reevaluated in the UC or ED   - oxyCODONE (ROXICODONE) 5 MG tablet; Take 1 tablet (5 mg) by mouth every 6 hours as needed for severe pain  - gabapentin (NEURONTIN) 400 MG capsule; TAKE 2 CAPSULES BY MOUTH 3 TO 4 TIMES PER DAY  - acetaminophen (TYLENOL) 500 MG tablet; Take 1-2 tablets (500-1,000 mg) by mouth every 6 hours as needed for mild pain    Cellulitis of other specified site  -n keflex    Dyspepsia  -rfills sent  - omeprazole (PRILOSEC) 20 MG DR capsule; Take 1 capsule (20 mg) by mouth daily      Argentina Momin PA-C  [facility]    Se Arriaga is a 57 year old, presenting for the following health issues:  Shingles (Pt is here for shingles. Pt state that it feel itchy, burning, very sore, draining and smelling. )        5/26/2023     3:57 PM   Additional Questions   Roomed by Hser   Accompanied by SElf     -hx of shingles.  Was here earlier this week and was started on valtrex and prednisone  -called with concerns about worseningsxs.  Sounded like possible cellulitis so she was started on keflex as well  -feels lots of itching and pain on the scalp.  The lesions on the ear and chin are much better  -notes drainage from the scalp  -no fevers  -it scratching it a lot       Review of Systems   Constitutional, HEENT, cardiovascular, pulmonary, gi and gu systems are negative, except as otherwise noted.      Objective    /79 (BP Location: Left arm, Patient Position: Sitting, Cuff Size: Adult Regular)   Pulse 77   Temp 97.9  F (36.6  C) (Temporal)   Resp 16   Ht 1.676 m (5' 6\")   Wt 63 kg (139 lb)   SpO2 98%   BMI 22.44 kg/m    Body mass index is 22.44 kg/m .  Physical Exam   GENERAL: healthy, alert and no distress  NECK: " no adenopathy, no asymmetry, masses, or scars and thyroid normal to palpation  SKIN: scalp with scabs and some yellowish drainage.  No fluctuance. Facial lesions are much improved, no scabbing          Prior to immunization administration, verified patients identity using patient s name and date of birth. Please see Immunization Activity for additional information.     Screening Questionnaire for Adult Immunization    Are you sick today?   No   Do you have allergies to medications, food, a vaccine component or latex?   yes   Have you ever had a serious reaction after receiving a vaccination?   No   Do you have a long-term health problem with heart, lung, kidney, or metabolic disease (e.g., diabetes), asthma, a blood disorder, no spleen, complement component deficiency, a cochlear implant, or a spinal fluid leak?  Are you on long-term aspirin therapy?   No   Do you have cancer, leukemia, HIV/AIDS, or any other immune system problem?   No   Do you have a parent, brother, or sister with an immune system problem?   No   In the past 3 months, have you taken medications that affect  your immune system, such as prednisone, other steroids, or anticancer drugs; drugs for the treatment of rheumatoid arthritis, Crohn s disease, or psoriasis; or have you had radiation treatments?   No   Have you had a seizure, or a brain or other nervous system problem?   No   During the past year, have you received a transfusion of blood or blood    products, or been given immune (gamma) globulin or antiviral drug?   No   For women: Are you pregnant or is there a chance you could become       pregnant during the next month?   No   Have you received any vaccinations in the past 4 weeks?   No     Immunization questionnaire was positive for at least one answer.  Notified .      Injection of  given by Carmen Martínez. Patient instructed to remain in clinic for 15 minutes afterwards, and to report any adverse reactions.     Screening performed by Carmen Martínez  on 5/26/2023 at 4:02 PM.

## 2023-06-05 ENCOUNTER — OFFICE VISIT (OUTPATIENT)
Dept: FAMILY MEDICINE | Facility: CLINIC | Age: 58
End: 2023-06-05
Payer: COMMERCIAL

## 2023-06-05 VITALS
HEART RATE: 85 BPM | BODY MASS INDEX: 23.14 KG/M2 | TEMPERATURE: 98.3 F | WEIGHT: 144 LBS | SYSTOLIC BLOOD PRESSURE: 124 MMHG | HEIGHT: 66 IN | RESPIRATION RATE: 16 BRPM | DIASTOLIC BLOOD PRESSURE: 86 MMHG | OXYGEN SATURATION: 96 %

## 2023-06-05 DIAGNOSIS — B02.8 HERPES ZOSTER WITH OTHER COMPLICATION: ICD-10-CM

## 2023-06-05 DIAGNOSIS — L29.9 ITCHY SCALP: Primary | ICD-10-CM

## 2023-06-05 DIAGNOSIS — Z12.31 VISIT FOR SCREENING MAMMOGRAM: ICD-10-CM

## 2023-06-05 DIAGNOSIS — Z12.11 SCREEN FOR COLON CANCER: ICD-10-CM

## 2023-06-05 PROCEDURE — 99214 OFFICE O/P EST MOD 30 MIN: CPT | Performed by: FAMILY MEDICINE

## 2023-06-05 RX ORDER — OXYCODONE HYDROCHLORIDE 5 MG/1
5 TABLET ORAL EVERY 6 HOURS PRN
Qty: 6 TABLET | Refills: 0 | Status: SHIPPED | OUTPATIENT
Start: 2023-06-05

## 2023-06-05 RX ORDER — HYDROCORTISONE 1 %
SOLUTION, NON-ORAL TOPICAL DAILY
Qty: 74 ML | Refills: 0 | Status: SHIPPED | OUTPATIENT
Start: 2023-06-05 | End: 2023-06-12

## 2023-06-05 NOTE — PROGRESS NOTES
Assessment & Plan     Itchy scalp  - hydrocortisone (SCALP RELIEF MAXIMUM STRENGTH) 1 % SOLN; Externally apply topically daily for 7 days  Herpes zoster with other complication  Discussed possibly increasing gabapentin dose or switch to tricyclic, discussed possible side effect of pain medication, discussed no plan on refilling the pain medication.  - oxyCODONE (ROXICODONE) 5 MG tablet; Take 1 tablet (5 mg) by mouth every 6 hours as needed for moderate to severe pain or severe pain    Screen for colon cancer  She stating that she had it done about 7 years ago, no record available.  She will follow-up with PCP.    Visit for screening mammogram        Review of external notes as documented elsewhere in note  30 minutes spent by me on the date of the encounter doing chart review, review of outside records, review of test results, interpretation of tests, patient visit and documentation            MD PITER [facility]    Se Arriaga is a 57 year old, presenting for the following health issues:  Shingle out break (X 3 weeks. Itchy, feel weak, and hurt)        6/5/2023    11:23 AM   Additional Questions   Roomed by Tasha DUMAS   Accompanied by self     History of Present Illness       Reason for visit:  Shingles    She eats 0-1 servings of fruits and vegetables daily.She consumes 0 sweetened beverage(s) daily.She exercises with enough effort to increase her heart rate 20 to 29 minutes per day.  She exercises with enough effort to increase her heart rate 5 days per week. She is missing 1 dose(s) of medications per week.       She was diagnosed with herpes zoster involving the right trigeminal branch about  3 weeks ago, initially treated with Valtrex, gabapentin, then did follow-up with  MICHAEL Elaine who added cephalexin, topical steroid cream, she stated that she still having itchy scalp, she still having pain, she is asking for more antibiotic and pain medication.    Review of Systems  "  Constitutional, HEENT, cardiovascular, pulmonary, gi and gu systems are negative, except as otherwise noted.      Objective    /86 (BP Location: Left arm, Patient Position: Sitting, Cuff Size: Adult Regular)   Pulse 85   Temp 98.3  F (36.8  C) (Temporal)   Resp 16   Ht 1.676 m (5' 6\")   Wt 65.3 kg (144 lb)   SpO2 96%   BMI 23.24 kg/m    Body mass index is 23.24 kg/m .  Physical Exam   GENERAL: healthy, alert and no distress  NECK: no adenopathy, no asymmetry, masses, or scars and thyroid normal to palpation  RESP: lungs clear to auscultation - no rales, rhonchi or wheezes  CV: regular rate and rhythm, normal S1 S2, no S3 or S4, no murmur, click or rub, no peripheral edema and peripheral pulses strong  ABDOMEN: soft, nontender, no hepatosplenomegaly, no masses and bowel sounds normal  MS: no gross musculoskeletal defects noted, no edema  SKIN: Dry scaly scalp especially on the right side, with no open sore, no vesicles.    No results found for any visits on 06/05/23.    This note was completed in part using a voice recognition software, any grammatical or context distortion are unintentional and inherent to the software.          .imm      "

## 2023-06-29 ENCOUNTER — TELEPHONE (OUTPATIENT)
Dept: FAMILY MEDICINE | Facility: CLINIC | Age: 58
End: 2023-06-29
Payer: COMMERCIAL

## 2023-06-29 DIAGNOSIS — R21 RASH: Primary | ICD-10-CM

## 2023-06-29 RX ORDER — HYDROCORTISONE VALERATE CREAM 2 MG/G
CREAM TOPICAL 2 TIMES DAILY
Qty: 45 G | Refills: 1 | Status: SHIPPED | OUTPATIENT
Start: 2023-06-29 | End: 2023-09-22

## 2023-06-29 NOTE — TELEPHONE ENCOUNTER
Can we talk about this one?  I think it would be a waste of time and a copay  The itching is likely from nerve damage and the shingles and the redness can last for weeks after shingles-do not think that there is anything to do unless we use some sort of medication to treat the neuropathic discomfort

## 2023-06-29 NOTE — TELEPHONE ENCOUNTER
"Order/Referral Request    Who is requesting: Patient    Orders being requested: referral to dermatology     Reason service is needed/diagnosis: patient was diagnosed with shingles of the scalp on 5/17/23, reports lesions/blisters have now completely resolved but has ongoing dry itchy rash still present on her scalp. She was prescribed hydrocortisone cream for this but was unable to  from pharmacy due to costs.     Per 6/5/23 office visit with Dr. Cooper:  \"She was diagnosed with herpes zoster involving the right trigeminal branch about 3 weeks ago, initially treated with Valtrex, gabapentin, then did follow-up with MICHAEL Elaine who added cephalexin, topical steroid cream, she stated that she still having itchy scalp\"    Does patient have a preference on a Group/Provider/Facility? \"somewhere in [facility]\"    Where to send orders: Place orders within Epic    Okay to leave a detailed message?: Yes at Cell number on file:    Telephone Information:   Mobile [phone]     Routing to [facility] for review.     Karie Lockett RN BSN  [facility]    "

## 2023-06-29 NOTE — TELEPHONE ENCOUNTER
Spoke face to face with Dr. Kowalski who is in agreement with plan.     Called patient and left detailed message as requested by pt informing her that: 1. derm referral is not needed at this time   2. her symptoms are expected after shingles infection  3. alternative hydrocortisone rx has been sent to her pharmacy     She was informed in voicemail that she could purchase topical hydrocortisone OTC if needed and/or call her insurance with further questions re: rx coverage.     Callback number provided should patient have any further questions/concerns.     Karie Lockett RN BSN  [facility]

## 2023-08-01 DIAGNOSIS — R10.13 DYSPEPSIA: ICD-10-CM

## 2023-08-01 NOTE — TELEPHONE ENCOUNTER
"Last Written Prescription Date:  5/26/23  Last Fill Quantity: 30,  # refills: 1   Last office visit provider:  6/5/23     Requested Prescriptions   Pending Prescriptions Disp Refills    omeprazole (PRILOSEC) 20 MG DR capsule 30 capsule 1     Sig: Take 1 capsule (20 mg) by mouth daily       PPI Protocol Passed - 8/1/2023  1:14 PM        Passed - Not on Clopidogrel (unless Pantoprazole ordered)        Passed - No diagnosis of osteoporosis on record        Passed - Recent (12 mo) or future (30 days) visit within the authorizing provider's specialty     Patient has had an office visit with the authorizing provider or a provider within the authorizing providers department within the previous 12 mos or has a future within next 30 days. See \"Patient Info\" tab in inbasket, or \"Choose Columns\" in Meds & Orders section of the refill encounter.              Passed - Medication is active on med list        Passed - Patient is age 18 or older        Passed - No active pregnacy on record        Passed - No positive pregnancy test in past 12 months             Soraya Hawkins RN 08/01/23 2:02 PM  "

## 2023-09-22 DIAGNOSIS — R21 RASH: ICD-10-CM

## 2023-09-22 DIAGNOSIS — L93.1 SUBACUTE CUTANEOUS LUPUS ERYTHEMATOSUS: Primary | ICD-10-CM

## 2023-09-22 DIAGNOSIS — B02.8 HERPES ZOSTER WITH OTHER COMPLICATION: ICD-10-CM

## 2023-09-22 DIAGNOSIS — R10.13 DYSPEPSIA: ICD-10-CM

## 2023-09-22 RX ORDER — HYDROCORTISONE VALERATE CREAM 2 MG/G
CREAM TOPICAL 2 TIMES DAILY
Qty: 45 G | Refills: 1 | Status: SHIPPED | OUTPATIENT
Start: 2023-09-22

## 2023-09-22 NOTE — TELEPHONE ENCOUNTER
Relayed message to patient. She is now requesting refills of gabapentin & omeprazole - routing to refill pool.

## 2023-09-22 NOTE — TELEPHONE ENCOUNTER
"Received call from patient requesting dermatology referral.     She has been experiencing a rash for several months initially thought to be shingles however this was determined to be cutaneous lupus during 9/20 ED visit. She states she has not experienced a lupus flare for 10 years but saw a dermatologist during that time with improvement in her symptoms.    Per ED note:  \"VZV/shingles considered but given that it crosses midline and patient has ongoing itchiness throughout the body makes this diagnosis less likely . . . lesions most consistent with cutaneous lupus . . . plan for hydrocortisone cream and cetirizine for itching, plan for patient to call her rheumatologist to schedule follow-up as soon as possible for visit\"    Patient is almost out of her hydrocortisone cream and requesting a refill of this as well.   "

## 2023-09-25 RX ORDER — GABAPENTIN 400 MG/1
CAPSULE ORAL
Qty: 180 CAPSULE | Refills: 3 | Status: SHIPPED | OUTPATIENT
Start: 2023-09-25 | End: 2024-09-11

## 2023-09-26 NOTE — TELEPHONE ENCOUNTER
FUTURE VISIT INFORMATION      FUTURE VISIT INFORMATION:  Date: 10.3.23  Time: 8:00  Location: [facility]  REFERRAL INFORMATION:  Referring provider:  [facility]  Referring providers clinic:  [facility]  Reason for visit/diagnosis  Lupus flare, Rash [R21], Subacute cutaneous lupus erythematosus [L93.1], recs in Epic, Ref by Argentina Momin PA-C, Per Pt      RECORDS REQUESTED FROM:       Clinic name Comments Records Status Imaging Status   [facility] 9.22.23, 5.26.23, 5.17.23  [facility]    6.5.23  [facility]    [facility] 9.20.23  [facility]    5.15.23  [facility]

## 2023-10-03 ENCOUNTER — PRE VISIT (OUTPATIENT)
Dept: DERMATOLOGY | Facility: CLINIC | Age: 58
End: 2023-10-03

## 2023-10-03 ENCOUNTER — OFFICE VISIT (OUTPATIENT)
Dept: DERMATOLOGY | Facility: CLINIC | Age: 58
End: 2023-10-03
Attending: PHYSICIAN ASSISTANT
Payer: COMMERCIAL

## 2023-10-03 DIAGNOSIS — R21 RASH: ICD-10-CM

## 2023-10-03 DIAGNOSIS — L93.1 SUBACUTE CUTANEOUS LUPUS ERYTHEMATOSUS: ICD-10-CM

## 2023-10-03 PROCEDURE — 99000 SPECIMEN HANDLING OFFICE-LAB: CPT | Performed by: PATHOLOGY

## 2023-10-03 PROCEDURE — 87077 CULTURE AEROBIC IDENTIFY: CPT | Performed by: STUDENT IN AN ORGANIZED HEALTH CARE EDUCATION/TRAINING PROGRAM

## 2023-10-03 PROCEDURE — 99204 OFFICE O/P NEW MOD 45 MIN: CPT | Performed by: STUDENT IN AN ORGANIZED HEALTH CARE EDUCATION/TRAINING PROGRAM

## 2023-10-03 RX ORDER — TRIAMCINOLONE ACETONIDE 1 MG/G
CREAM TOPICAL 2 TIMES DAILY
Qty: 454 G | Refills: 3 | Status: SHIPPED | OUTPATIENT
Start: 2023-10-03

## 2023-10-03 RX ORDER — FLUOCINONIDE TOPICAL SOLUTION USP, 0.05% 0.5 MG/ML
SOLUTION TOPICAL 2 TIMES DAILY
Qty: 60 ML | Refills: 3 | Status: SHIPPED | OUTPATIENT
Start: 2023-10-03

## 2023-10-03 NOTE — PATIENT INSTRUCTIONS
- Apply triamcinolone cream to whole body (over affected areas) twice daily  - Apply fluocinonide solution to scalp twice daily  - Message us in 2 weeks to see if you're improving  - We are culturing the wound on your scalp    DRY SKIN CARE SYMPTOMS Dry skin is abnormal scaling, flaking, and cracking of the upper layer of the skin and leads to itching and discomfort.     Even if dry skin is not the primary cause of your skin issues, dry skin can make other problems worse and more difficult or even impossible to treat     CAUSES: Dry skin is due to lack of water in the skin's outer layer.  Normally, oil glands in the skin produce an oily film, which traps in the skin and prevents it from evaporating.  There are several reasons why skin gets dry: low r elative humidity levels, excessive contact with soaps and detergents, heredity, aging, and/or medically related conditions such as eczema, psoriasis, diabetes, and contact dermatitis.     PREVENTION AND TREATMENT Hot showers can make your skin dry.  Try lukewarm baths/showers and limiting your bath or shower to less time. Soaps can make your skin dry.  We recommend limiting the daily application of soap to  essential areas  such as the groin and axillae (underarms). Certain soaps, especially antibacterial soaps (Dial, Ivory, Zest, Cambodian Spring) are especially drying.  We recommend using moisturizing soaps or mild, gentle cleansers as below.  Bar soaps tend to be less irritating than liquid soaps, but there are exceptions (see below).     After your shower or bath,  pat  yourself dry and immediately apply moisturizer such as Vaseline plain petroleum jelly or other moisturizer as listed below.  Repeat this application of moisturizer two to four times daily as needed.     Mild dry skin may be treated with lotions or creams, but severe dry or itchy skin requires ointments (greasy moisturizers such as petroleum jelly).  In general, the thicker it is, the better it will serve  as a moisturizer. If prone to acne or folliculitis, avoid ointments on the face and scalp.  Apply in the direction of hair growth if applying to hair-bearing areas. Avoid using personal care products with fragrances or colors.  If it is pretty and/or smells good, there is a good chance that it can irritate your skin and make you itchy.     Avoid using fabric softener or dryer sheets on anyone's laundry in the house.  Even if you don't use them on your laundry, the left-over particles will stay in the dryer and get on your clothes from other people's laundry.  These particles can irritate your skin.     Clothing:  Wash new clothing before wearing. Avoid fabric softeners. Limit detergent quantity so that rinse water is clear. Choose soft fabrics, drawstring instead of elastic, buttons rather than metal snaps, and light colors . The safest footwear is white canvas rather than rubber or leather.     Anti-itch:  Products that contain pramoxine, a non-allergenic medication that will temporarily numb the skin (Aveeno Anti Itch cream, Prax lotion, CeraVe Anti-itch, Itch-X). Avoid creams that contain diphenhydramine. Store th e medication in the refrigerator and apply it cold for a more soothing effect.       Wet Wraps Start wet wraps to bad areas. This is especially helpful on hands, arms, feet, lower legs.   - 1. Apply prescribed topical medication to the worst inflamed areas and bland moisturizer to areas that are less inflamed   - 2. Then wet white cotton gloves (or white cotton socks or gauze or white washcloth or whatever you prefer), in vinegar water (1 tablespoon of vinegar to glass of water)   - 3. Squeeze the gauze/cloth gently to the point that it is still noticeably damp   - 4. Wrap it over the affected area   - 5. Then wrap the area with plastic saran wrap on top (or dry gloves or socks or pajamas or cloth or whatever you prefer).   - 6. Leave on for a goal of at least 2 hours.  Some people prefer to instead do  "this while they are sleeping.   - 7. Then remove and apply petroleum jelly (plain Vaseline) or other bland moisturizer   - 8. You can do this once to twice a day, however if you only do it once a day, then make sure you apply the medicated topical a second time during the day     Please follow the \"SAFE PRODUCT LIST\" provided in the table below. This is based on results of allergy testing that has been done on a large number of patients, and these products generally tend to not contain produ cts that people are allergic to, although everyone is different. Those marked with an \"A\" have the most data: Less restrictive list:       Antiperspirant/Deodorant    Alaway Deoderant A   Certain Dri:  Clinical Strength Roll-on Antiperspirant A   Cleure: Roll-On or Spray deodorant A   Cleure: Stick Deodorant A   Crystal:  Body Deodorant Stick A   Crystal:  Roll-On Body Deodorant A   Drysol DOM A   Hypercare Solution A   Nallely: For Men Advanced Control Unscented Invisible Roll-On Antiperspirant & Deodorant A   Speed Stick: Power Unscented Antiperspirant & Deodorant Solid A Sure: Antiperspirant & Deodorant Spray, Unscented A   [employer] [place]: Naturally Dry Unscented Antiperspirant  A   VMV Hypoallergenics: Essence Skin-Saving Antiperspirant  A       Hair Conditioners    Cleure: Replenishing Conditioner A   DHS Conditioning Rinse with Panthenol A   DHS Color Safe Rinse A   Magick Botanicals Spray-On Detangler and Conditioner A   Magick Botanicals Oil Free Conditioner B   VMV Hypoallergenics Essence Skin-Saving Conditioner C     Hair Shampoos  Cleure Volumizing Shampoo- SLS Free C   DHS Sal Shampoo C   DHS Tar Shampoo, Fragrance Free C   VMV Essence Skin-Saving Liam Wash Hair + Body \"Big Softie\" Shampoo C   VMV Essence Skin-Saving Superwash Hair + Body Milk Shampoo C Magick Botanicals Oil Free Shampoo, Fragrance Free C   Psoriasin Therapeutic Shampoo &a Body Wash C   Summers Labs: Tarsum Shampoo/Gel C      Moisturizers    Pilar " [facility] Skin Care: AB Skincare Lip Protectant A   Pilar [facility] Skin Care: AB Skincare Moisturizer A   Atrac-Tain: Atrac-Train Cream A   Aveeno Baby Eczema Therapy Moisturizing Cream A   Aveeno Active Naturals Daily Moisturizing Lotion With Sunscreen SPF 30 B   Aveeno Active Naturals Eczema Therapy Moisturizing Cream A CeraVe Healing Ointment A   CeraVe  A.M. Facial Moisturizing Lotion SPF 30 B   CeraVe  P.M. Facial Moisturizing Lotion B   CeraVe Moisturizing Lotion B   CeraVe Renewing SA Lotion B   CeraVe Baby Moisturizing Lotion A   Cleure: Dry Skin Relief Body Lotion A   Elta MD: Intense Moisturizer A   Elta MD: Laser Post Procedure Balm A   Excipial: Rapid Repair Hand Cream A   Excipial: 10% Urea Hydrating Healing Lotion B   Magick Botanicals Oil Moisture Lotion, Fragrance Free B   Neosporin: Eczema Essentials Daily Moisturizing Cream A Neutrogena Norwegian Formula Hand Cream Fragrance Free B Skintifique: Moisturizing Lotion HP A   Sween 24 Once a Day Moisturizing Body Cream A   Theraplex: Eczema Therapy Moisturizing Skin Protectant A Theraplex: Emollient For Severely Dry Skin A   Theraplex: Barrier Elvaston A   Theraplex: Clearlotion A   Theraplex: Ultra Crème A   Vaniply: Vanicream Diaper Rash Ointment A   Vaseline Jelly A   VMV Hypoallergenics: Red Better Flare-Up Elvaston A   VMV Hypoallergenics: Spring Fresh Oil-Free Nourisher for Oily Skin A   VMV Hypoallergenics: Creammmy-Rich Intensive Moisture Milk 15 for Dry Skin C   VMV Hypoallergenics: Grandma Sailaja's Oil's Well Nurturing Do-It-Oil  C   VMV Hypoallergenics: Grandma Sailaja's The Big, Crooks Martinez-Martinez Elvaston C   VMV Hypoallergenics: Grandma Sailaja's Mommycoddling All-Over Lotion C   VMV Hypoallergenics: hydra Balance Smart Moisturizer For Combination Skin C   VMV Hypoallergenics: Red Better Daily Therapy Moisturizer  C   VMV Hypoallergenics: Essence Hand + Body Smoother  C   VMV Hypoallergenics: Red Better Deeply Soothing Cleansing Cream C       Soaps/Cleansers     Pilar Lopez Skincare: AB Skincare Cleanser C   Aqua Glycolic: Facial Cleanser B   Aveeno Baby Cleansing Therapy Moisturizing Wash, Fragrance Free A   Aveeno Naturals Moisturizing Bar C   Bedside-Care Sensitive Skin Foam Foaming Body Cleanser & C Bedside-Care Sensitive Spray Body Cleanser & Shampoo C   Free & Clear: Liquid Cleanser  A   CeraVe Hydrating Cleanser C   CeraVe Renewing SA Cleanser C   CeraVe Baby Wash & Shampoo C   Cleure: Dry Sensitive Skin Lotion Clean

## 2023-10-03 NOTE — PROGRESS NOTES
Dermatology Rooming Note    Bart Kraft's goals for this visit include:   Chief Complaint   Patient presents with    Autoimmune Disease     Pt states scalp has been burning and itching and was told at ER that it was shingles. Pt states it has been progressing across her body with sores and pruritus.      Andrea Gooden, EMT-B

## 2023-10-03 NOTE — LETTER
10/3/2023       RE: Bart Kraft  [address] [place] [address]     Dear Colleague,    Thank you for referring your patient, Bart Kraft, to the [facility] [place] at [facility]. Please see a copy of my visit note below.    Dermatology Rooming Note    Bart Kraft's goals for this visit include:   Chief Complaint   Patient presents with    Autoimmune Disease     Pt states scalp has been burning and itching and was told at ER that it was shingles. Pt states it has been progressing across her body with sores and pruritus.      Andrea Gooden, EMT-B      [facility] Dermatology Note  Encounter Date: Oct 3, 2023  Office Visit     Dermatology Problem List:  1. Eczematous dermatitis  -Current tx: triamcinolone 0.1% cream, fluocinonide 0.05% solution, allergy safe products    ____________________________________________    Assessment & Plan:    # Eczematous dermatitis. (Chronic flaring)  - ongoing for months prev dx as shingles  - Monitor: Patient to continue monitoring at home and will contact the clinic for any changes. Will call the clinic in 2 weeks and leave a message for Dr. Harry about improvement of rash.   - Apply triamcinolone 0.1% cream to entire body twice daily   - Apply fluocinonide 0.05% solution to scalp twice daily   - Skin alan culture of the scalp collected today   - Pt counseled on preventing dry skin and switching to allergy safe products   - If rash does not improve, a return visit can be scheduled and possible future tx includes Dupixent    #Pruritus  -Worse on the history of chronic neck pain as well as bulging disks in her cervical spine.  Discussed that if anti-inflammatory measures resolve the itching on the scalp we could consider neurogenic itching directed treatment with things like gabapentin will add off on that for now    Intertriginous regions of elbows and forearms    Procedures  Performed:   None    Follow-up: 2 week(s) via phone call, or earlier for new or changing lesions    Staff and Medical Student:     Pt seen and staffed with attending physician, Dr. Israel Polanco, MS3    ____________________________________________    CC: Autoimmune Disease (Pt states scalp has been burning and itching and was told at ER that it was shingles. Pt states it has been progressing across her body with sores and pruritus. )    HPI:  Ms. Bart Kraft is a(n) 58 year old female who presents today as a new patient for a rash of her trunk, extremities, and scalp.    Ms. Kraft has a PMH of lupus, she had it as a teenager but it went away on its own and then recurred with pregnancy. Diagnosed with lupus at the [facility] in 1997. She had a rash on her face, chest, and back, myalgias and fatigue. They treated her with a solution that she put in the bath, light tx, and she possibly took plaquenil in 1997. She has also been treated with pain pills and prednisone. It has been 10 years since she had a flare which lasted 2 weeks.     Lately she is tired, sleeps a lot, is sorer than normal. Itching of her scalp and body started in March/April. She went to ER in May and they told her it was shingles. In June she went to her clinic and they gave her prednisone twice and hydrocortisone 0.1% cream. 2 weeks ago she went to ER at [place] and that doctor sent her here because he thought it was her lupus flaring. Currently she has itching and a rash on her abdomen, back, scalp. The hydrocortisone cream helps a little bit. She has been taking an antihistamine. Her scalp leaks clear liquid so she has been washing her hair every morning.    Patient is otherwise feeling well, without additional skin concerns.    Labs:  Sedimentation rate, CBC , and BMP  reviewed.    Physical Exam:  Vitals: There were no vitals taken for this visit.  SKIN: Total skin excluding the undergarment areas was performed. The exam included the  head/face, neck, both arms, chest, back, abdomen, both legs, digits and/or nails.   - 0.5 cm-1 cm red, thickened, scaly plaques scattered on the trunk, neck, and legs  - red, crusted plaques with erosions and excoriations on the occipital scalp  - light brown, well-demarcated round papule on the left thigh  - No other lesions of concern on areas examined.     Medications:  Current Outpatient Medications   Medication    acetaminophen (TYLENOL) 500 MG tablet    cyclobenzaprine (FLEXERIL) 10 MG tablet    gabapentin (NEURONTIN) 400 MG capsule    hydrocortisone (CORTAID) 1 % external ointment    hydrocortisone (WESTCORT) 0.2 % external cream    Hydrocortisone 0.25 % LOTN    ibuprofen (ADVIL,MOTRIN) 200 MG tablet    Multiple Vitamin (MULTI-VITAMIN PO)    omeprazole (PRILOSEC) 20 MG DR capsule    predniSONE (DELTASONE) 10 MG tablet    UNABLE TO FIND    oxyCODONE (ROXICODONE) 5 MG tablet     No current facility-administered medications for this visit.      Past Medical History:   Patient Active Problem List   Diagnosis    Vitamin D deficiency    Zinc deficiency    Iron deficiency anemia    Postsurgical malabsorption    History of Scar-en-Y gastric bypass    Low bone mass    Lupus (H)    Fibromyalgia    Menopausal syndrome (hot flashes)    Mild intermittent asthma    Post-menopausal bleeding    Chronic pain syndrome    Subacute cutaneous lupus erythematosus    Mild episode of recurrent major depressive disorder with anxious distress    Tobacco use disorder    Right leg numbness    Right foot drop    Gastroesophageal reflux disease without esophagitis     Past Medical History:   Diagnosis Date    Arthritis     B12 deficiency     sec gastric bypass surgery    Gastroesophageal reflux disease without esophagitis 9/7/2020    Intestinal obstruction (H) 5/20/2008    Iron deficiency     on iron infusion since 6/2012    Low bone mass 7/19/2018    Mild episode of recurrent major depressive disorder (H24) 9/2/2020    Mild intermittent  asthma 9/19/2019    Neuromuscular disorder (H)     Stomach ulcer     Subacute cutaneous lupus erythematosus     Subacute cutaneous lupus erythematosus         CC Argentina Momin PA-C  [address] [place] [address] on close of this encounter.

## 2023-10-03 NOTE — PROGRESS NOTES
I have personally examined this patient and agree with the medical student's documentation and plan of care. I have reviewed and amended the medical student's note as necessary.The documentation accurately reflects my clinical observations, diagnoses, treatment and follow-up plans.     Israel Harry MD  Dermatology Staff      [facility] Dermatology Note  Encounter Date: Oct 3, 2023  Office Visit     Dermatology Problem List:  1. Eczematous dermatitis  -Current tx: triamcinolone 0.1% cream, fluocinonide 0.05% solution, allergy safe products    ____________________________________________    Assessment & Plan:    # Eczematous dermatitis. (Chronic flaring)  - ongoing for months prev dx as shingles  - Monitor: Patient to continue monitoring at home and will contact the clinic for any changes. Will call the clinic in 2 weeks and leave a message for Dr. Harry about improvement of rash.   - Apply triamcinolone 0.1% cream to entire body twice daily   - Apply fluocinonide 0.05% solution to scalp twice daily   - Skin alan culture of the scalp collected today   - Pt counseled on preventing dry skin and switching to allergy safe products   - If rash does not improve, a return visit can be scheduled and possible future tx includes Dupixent    #Pruritus  -Worse on the history of chronic neck pain as well as bulging disks in her cervical spine.  Discussed that if anti-inflammatory measures resolve the itching on the scalp we could consider neurogenic itching directed treatment with things like gabapentin will add off on that for now    Intertriginous regions of elbows and forearms    Procedures Performed:   None    Follow-up: 2 week(s) via phone call, or earlier for new or changing lesions    Staff and Medical Student:     Pt seen and staffed with attending physician, Dr. Israel Polanco, MS3    ____________________________________________    CC: Autoimmune Disease (Pt states scalp has been burning  and itching and was told at ER that it was shingles. Pt states it has been progressing across her body with sores and pruritus. )    HPI:  Ms. Bart Kraft is a(n) 58 year old female who presents today as a new patient for a rash of her trunk, extremities, and scalp.    Ms. Kraft has a PMH of lupus, she had it as a teenager but it went away on its own and then recurred with pregnancy. Diagnosed with lupus at the [facility] in 1997. She had a rash on her face, chest, and back, myalgias and fatigue. They treated her with a solution that she put in the bath, light tx, and she possibly took plaquenil in 1997. She has also been treated with pain pills and prednisone. It has been 10 years since she had a flare which lasted 2 weeks.     Lately she is tired, sleeps a lot, is sorer than normal. Itching of her scalp and body started in March/April. She went to ER in May and they told her it was shingles. In June she went to her clinic and they gave her prednisone twice and hydrocortisone 0.1% cream. 2 weeks ago she went to ER at [place] and that doctor sent her here because he thought it was her lupus flaring. Currently she has itching and a rash on her abdomen, back, scalp. The hydrocortisone cream helps a little bit. She has been taking an antihistamine. Her scalp leaks clear liquid so she has been washing her hair every morning.    Patient is otherwise feeling well, without additional skin concerns.    Labs:  Sedimentation rate, CBC , and BMP  reviewed.    Physical Exam:  Vitals: There were no vitals taken for this visit.  SKIN: Total skin excluding the undergarment areas was performed. The exam included the head/face, neck, both arms, chest, back, abdomen, both legs, digits and/or nails.   - 0.5 cm-1 cm red, thickened, scaly plaques scattered on the trunk, neck, and legs  - red, crusted plaques with erosions and excoriations on the occipital scalp  - light brown, well-demarcated round papule on the left thigh  - No other  lesions of concern on areas examined.     Medications:  Current Outpatient Medications   Medication    acetaminophen (TYLENOL) 500 MG tablet    cyclobenzaprine (FLEXERIL) 10 MG tablet    gabapentin (NEURONTIN) 400 MG capsule    hydrocortisone (CORTAID) 1 % external ointment    hydrocortisone (WESTCORT) 0.2 % external cream    Hydrocortisone 0.25 % LOTN    ibuprofen (ADVIL,MOTRIN) 200 MG tablet    Multiple Vitamin (MULTI-VITAMIN PO)    omeprazole (PRILOSEC) 20 MG DR capsule    predniSONE (DELTASONE) 10 MG tablet    UNABLE TO FIND    oxyCODONE (ROXICODONE) 5 MG tablet     No current facility-administered medications for this visit.      Past Medical History:   Patient Active Problem List   Diagnosis    Vitamin D deficiency    Zinc deficiency    Iron deficiency anemia    Postsurgical malabsorption    History of Scar-en-Y gastric bypass    Low bone mass    Lupus (H)    Fibromyalgia    Menopausal syndrome (hot flashes)    Mild intermittent asthma    Post-menopausal bleeding    Chronic pain syndrome    Subacute cutaneous lupus erythematosus    Mild episode of recurrent major depressive disorder with anxious distress    Tobacco use disorder    Right leg numbness    Right foot drop    Gastroesophageal reflux disease without esophagitis     Past Medical History:   Diagnosis Date    Arthritis     B12 deficiency     sec gastric bypass surgery    Gastroesophageal reflux disease without esophagitis 9/7/2020    Intestinal obstruction (H) 5/20/2008    Iron deficiency     on iron infusion since 6/2012    Low bone mass 7/19/2018    Mild episode of recurrent major depressive disorder (H24) 9/2/2020    Mild intermittent asthma 9/19/2019    Neuromuscular disorder (H)     Stomach ulcer     Subacute cutaneous lupus erythematosus     Subacute cutaneous lupus erythematosus         HOMER Momin PA-C  [address] [place] [address] on close of this encounter.

## 2023-10-05 LAB
BACTERIA SKIN AEROBE CULT: ABNORMAL
BACTERIA SKIN AEROBE CULT: ABNORMAL

## 2023-10-10 ENCOUNTER — TELEPHONE (OUTPATIENT)
Dept: DERMATOLOGY | Facility: CLINIC | Age: 58
End: 2023-10-10
Payer: COMMERCIAL

## 2023-10-10 DIAGNOSIS — L29.9 SCALP PRURITUS: ICD-10-CM

## 2023-10-10 DIAGNOSIS — L01.00 IMPETIGO: Primary | ICD-10-CM

## 2023-10-10 RX ORDER — CLOBETASOL PROPIONATE 0.5 MG/ML
SOLUTION TOPICAL 2 TIMES DAILY
Qty: 50 ML | Refills: 3 | Status: SHIPPED | OUTPATIENT
Start: 2023-10-10 | End: 2024-01-03

## 2023-10-10 RX ORDER — DOXYCYCLINE HYCLATE 100 MG
100 TABLET ORAL 2 TIMES DAILY
Qty: 28 TABLET | Refills: 0 | Status: SHIPPED | OUTPATIENT
Start: 2023-10-10

## 2023-10-10 NOTE — TELEPHONE ENCOUNTER
Please call pt and let her know that we have called in a antibiotic to treat the bacteria that grew on the culture.     Additionally we can try to switch to a different steroid solution called clobetasol. She should remember that if hte topical steroids dont work we want to explore treatment of nerve related itch given her history of bulging discs in her neck     EM

## 2023-10-10 NOTE — TELEPHONE ENCOUNTER
# Eczematous dermatitis. (Chronic flaring)  - ongoing for months prev dx as shingles  - Monitor: Patient to continue monitoring at home and will contact the clinic for any changes. Will call the clinic in 2 weeks and leave a message for Dr. Harry about improvement of rash.   - Apply triamcinolone 0.1% cream to entire body twice daily   - Apply fluocinonide 0.05% solution to scalp twice daily    fluocinonide (LIDEX) 0.05 % external solution 60 mL

## 2023-10-10 NOTE — TELEPHONE ENCOUNTER
Called patient and relayed providers message. Pt in agreement with instructions, closing encounter.

## 2023-10-10 NOTE — TELEPHONE ENCOUNTER
[facility] Call Center    Phone Message    May a detailed message be left on voicemail: yes     Reason for Call: Medication Question or concern regarding medication   Prescription Clarification  Name of Medication:   fluocinonide (LIDEX) 0.05 % external solution   Prescribing Provider: Dr. Harry   Pharmacy: [id number] [address]    What on the order needs clarification? Pt called and wanted to know what should she do now. Pt was prescribed two small bottles and is currently out of it. Pt said that the area she is applying this solution to is quite big and her insurance will not give her another fill. Please call her back to discuss. Thanks       Action Taken: Message routed to:  [facility] DERM    Travel Screening: Not Applicable

## 2023-10-10 NOTE — TELEPHONE ENCOUNTER
Pt also has concern about swab result/ culture from scalp.     Reason for Call: Other: Pt called and would like a call back from the care team to discuss the results from the swab on her scalp. Thanks

## 2023-10-10 NOTE — TELEPHONE ENCOUNTER
[facility] Call Center    Phone Message    May a detailed message be left on voicemail: yes     Reason for Call: Other: Pt called and would like a call back from the care team to discuss the results from the swab on her scalp. Thanks      Action Taken: Message routed to:  [facility] DERM    Travel Screening: Not Applicable

## 2023-11-28 DIAGNOSIS — R10.13 DYSPEPSIA: ICD-10-CM

## 2023-11-29 NOTE — TELEPHONE ENCOUNTER
Medication Question or Refill        What medication are you calling about (include dose and sig)?:    Disp Refills Start End TRENT   omeprazole (PRILOSEC) 20 MG DR capsule 30 capsule 1 9/25/2023  No   Sig - Route: Take 1 capsule (20 mg) by mouth daily - Oral       Preferred Pharmacy:   [facility] #[id number] [address] & [address] [address]  Phone: [phone] Fax: [phone]      Controlled Substance Agreement on file:   CSA -- Patient Level:    CSA: None found at the patient level.       Who prescribed the medication?: Argentina Momin PA-C    Do you need a refill? Yes    When did you use the medication last? Unknown     Patient offered an appointment? No    Do you have any questions or concerns?  No

## 2023-12-18 DIAGNOSIS — L29.9 SCALP PRURITUS: ICD-10-CM

## 2024-01-03 RX ORDER — CLOBETASOL PROPIONATE 0.5 MG/ML
SOLUTION TOPICAL 2 TIMES DAILY
Qty: 50 ML | Refills: 0 | Status: SHIPPED | OUTPATIENT
Start: 2024-01-03

## 2024-01-03 NOTE — TELEPHONE ENCOUNTER
CLOBETASOL PROP 0.05% SOL 25ML      Last Written Prescription Date:  10/10/23  Last Fill Quantity: 50 ml ,   # refills: 3  Last Office Visit : 10/10/23  Future Office visit:  None> 2 weeks recommended    Routing refill request to provider for review/approval because:   RF process #3

## 2024-01-12 ENCOUNTER — TELEPHONE (OUTPATIENT)
Dept: FAMILY MEDICINE | Facility: CLINIC | Age: 59
End: 2024-01-12
Payer: COMMERCIAL

## 2024-01-19 NOTE — TELEPHONE ENCOUNTER
Patient Quality Outreach    Patient is due for the following:   Breast Cancer Screening - Mammogram    Next Steps:   Schedule a Adult Preventative    Type of outreach:    Patient hung up phone.      Next Steps:  Reach out within 90 days via Phone.    Max number of attempts reached: Yes. Will try again in 90 days if patient still on fail list.    Questions for provider review:    None           Andrea Behrend

## 2024-03-20 DIAGNOSIS — L29.9 SCALP PRURITUS: ICD-10-CM

## 2024-04-19 ENCOUNTER — TELEPHONE (OUTPATIENT)
Dept: FAMILY MEDICINE | Facility: CLINIC | Age: 59
End: 2024-04-19
Payer: COMMERCIAL

## 2024-04-29 NOTE — TELEPHONE ENCOUNTER
Patient Quality Outreach    Patient is due for the following:   Breast Cancer Screening - Mammogram    Next Steps:   Schedule a Adult Preventative    Type of outreach:    Phone, spoke to patient/parent. Patient declined to schedule; says she has a lot going on.  Ok to call back.    Next Steps:  Reach out within 90 days via Phone.    Max number of attempts reached: Yes. Will try again in 90 days if patient still on fail list.    Questions for provider review:    None           Andrea Behrend

## 2024-05-16 DIAGNOSIS — R10.13 DYSPEPSIA: ICD-10-CM

## 2024-05-16 NOTE — TELEPHONE ENCOUNTER
Medication Question or Refill        What medication are you calling about (include dose and sig)?: omeprazole (PRILOSEC) 20 MG DR capsule     Preferred Pharmacy:     [facility] #[id number] [address] & [address] [address]  Phone: [phone] Fax: [phone]    Controlled Substance Agreement on file:   CSA -- Patient Level:    CSA: None found at the patient level.       Who prescribed the medication?: MICHAEL Paul    Do you need a refill? Yes    When did you use the medication last? NA    Patient offered an appointment? No    Do you have any questions or concerns?  No

## 2024-06-18 RX ORDER — CLOBETASOL PROPIONATE 0.5 MG/ML
SOLUTION TOPICAL 2 TIMES DAILY
Qty: 50 ML | Refills: 0 | OUTPATIENT
Start: 2024-06-18

## 2024-08-13 DIAGNOSIS — R10.13 DYSPEPSIA: ICD-10-CM

## 2024-08-14 DIAGNOSIS — R10.13 DYSPEPSIA: ICD-10-CM

## 2024-09-06 DIAGNOSIS — B02.8 HERPES ZOSTER WITH OTHER COMPLICATION: ICD-10-CM

## 2024-09-11 RX ORDER — GABAPENTIN 400 MG/1
CAPSULE ORAL
Qty: 180 CAPSULE | Refills: 3 | Status: SHIPPED | OUTPATIENT
Start: 2024-09-11

## 2024-11-07 ASSESSMENT — PATIENT HEALTH QUESTIONNAIRE - PHQ9: SUM OF ALL RESPONSES TO PHQ QUESTIONS 1-9: 1
